# Patient Record
Sex: FEMALE | Race: OTHER | Employment: STUDENT | URBAN - METROPOLITAN AREA
[De-identification: names, ages, dates, MRNs, and addresses within clinical notes are randomized per-mention and may not be internally consistent; named-entity substitution may affect disease eponyms.]

---
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
## 2022-03-01 ENCOUNTER — OFFICE VISIT (OUTPATIENT)
Dept: OBGYN CLINIC | Facility: CLINIC | Age: 22
End: 2022-03-01
Payer: MEDICAID

## 2022-03-01 VITALS — BODY MASS INDEX: 28 KG/M2 | WEIGHT: 140 LBS

## 2022-03-01 DIAGNOSIS — R35.0 URINE FREQUENCY: ICD-10-CM

## 2022-03-01 DIAGNOSIS — N91.5 HYPOMENORRHEA: ICD-10-CM

## 2022-03-01 DIAGNOSIS — B37.3 VULVOVAGINITIS DUE TO YEAST: ICD-10-CM

## 2022-03-01 DIAGNOSIS — N89.8 VAGINAL DISCHARGE: Primary | ICD-10-CM

## 2022-03-01 PROBLEM — B37.31 VULVOVAGINITIS DUE TO YEAST: Status: ACTIVE | Noted: 2020-09-10

## 2022-03-01 PROBLEM — Z01.419 WELL WOMAN EXAM WITH ROUTINE GYNECOLOGICAL EXAM: Status: RESOLVED | Noted: 2020-09-10 | Resolved: 2022-03-01

## 2022-03-01 LAB
BILIRUBIN: NEGATIVE
CONTROL LINE PRESENT WITH A CLEAR BACKGROUND (YES/NO): YES YES/NO
GLUCOSE (URINE DIPSTICK): NEGATIVE MG/DL
KETONES (URINE DIPSTICK): NEGATIVE MG/DL
MULTISTIX LOT#: ABNORMAL NUMERIC
NITRITE, URINE: NEGATIVE
PH, URINE: 6 (ref 4.5–8)
PREGNANCY TEST, URINE: NEGATIVE
SPECIFIC GRAVITY: 1.01 (ref 1–1.03)
URINE-COLOR: YELLOW
UROBILINOGEN,SEMI-QN: 0.2 MG/DL (ref 0–1.9)

## 2022-03-01 PROCEDURE — 81002 URINALYSIS NONAUTO W/O SCOPE: CPT | Performed by: OBSTETRICS & GYNECOLOGY

## 2022-03-01 PROCEDURE — 99213 OFFICE O/P EST LOW 20 MIN: CPT | Performed by: OBSTETRICS & GYNECOLOGY

## 2022-03-01 PROCEDURE — 81025 URINE PREGNANCY TEST: CPT | Performed by: OBSTETRICS & GYNECOLOGY

## 2022-03-01 RX ORDER — FLUCONAZOLE 150 MG/1
TABLET ORAL
Qty: 1 TABLET | Refills: 1 | Status: SHIPPED | OUTPATIENT
Start: 2022-03-01

## 2022-03-12 ENCOUNTER — NURSE ONLY (OUTPATIENT)
Dept: OBGYN CLINIC | Facility: CLINIC | Age: 22
End: 2022-03-12
Payer: MEDICAID

## 2022-03-12 VITALS — DIASTOLIC BLOOD PRESSURE: 76 MMHG | SYSTOLIC BLOOD PRESSURE: 114 MMHG

## 2022-03-12 DIAGNOSIS — Z23 NEED FOR HPV VACCINE: Primary | ICD-10-CM

## 2022-03-12 PROCEDURE — 90651 9VHPV VACCINE 2/3 DOSE IM: CPT | Performed by: NURSE PRACTITIONER

## 2022-03-12 PROCEDURE — 90471 IMMUNIZATION ADMIN: CPT | Performed by: NURSE PRACTITIONER

## 2022-03-12 PROCEDURE — 81025 URINE PREGNANCY TEST: CPT | Performed by: NURSE PRACTITIONER

## 2022-03-12 PROCEDURE — 3078F DIAST BP <80 MM HG: CPT | Performed by: NURSE PRACTITIONER

## 2022-03-12 PROCEDURE — 3074F SYST BP LT 130 MM HG: CPT | Performed by: NURSE PRACTITIONER

## 2022-08-31 ENCOUNTER — APPOINTMENT (OUTPATIENT)
Dept: GENERAL RADIOLOGY | Age: 22
End: 2022-08-31
Attending: NURSE PRACTITIONER
Payer: MEDICAID

## 2022-08-31 ENCOUNTER — HOSPITAL ENCOUNTER (OUTPATIENT)
Age: 22
Discharge: HOME | End: 2022-08-31
Payer: MEDICAID

## 2022-08-31 VITALS
SYSTOLIC BLOOD PRESSURE: 114 MMHG | DIASTOLIC BLOOD PRESSURE: 71 MMHG | BODY MASS INDEX: 29.23 KG/M2 | RESPIRATION RATE: 16 BRPM | OXYGEN SATURATION: 100 % | WEIGHT: 145 LBS | HEIGHT: 59 IN | TEMPERATURE: 98 F | HEART RATE: 68 BPM

## 2022-08-31 DIAGNOSIS — K59.00 CONSTIPATION, UNSPECIFIED CONSTIPATION TYPE: Primary | ICD-10-CM

## 2022-08-31 LAB
#MXD IC: 0.9 X10ˆ3/UL (ref 0.1–1)
B-HCG UR QL: NEGATIVE
BILIRUB UR QL STRIP: NEGATIVE
BUN BLD-MCNC: <5 MG/DL (ref 7–18)
CHLORIDE BLD-SCNC: 102 MMOL/L (ref 98–112)
CLARITY UR: CLEAR
CO2 BLD-SCNC: 25 MMOL/L (ref 21–32)
COLOR UR: YELLOW
CREAT BLD-MCNC: 0.5 MG/DL
GFR SERPLBLD BASED ON 1.73 SQ M-ARVRAT: 138 ML/MIN/1.73M2 (ref 60–?)
GLUCOSE BLD-MCNC: 93 MG/DL (ref 70–99)
GLUCOSE UR STRIP-MCNC: NEGATIVE MG/DL
HCT VFR BLD AUTO: 36.8 %
HCT VFR BLD CALC: 39 %
HGB BLD-MCNC: 11.4 G/DL
HGB UR QL STRIP: NEGATIVE
ISTAT IONIZED CALCIUM FOR CHEM 8: 1.25 MMOL/L (ref 1.12–1.32)
KETONES UR STRIP-MCNC: NEGATIVE MG/DL
LYMPHOCYTES # BLD AUTO: 2.4 X10ˆ3/UL (ref 1–4)
LYMPHOCYTES NFR BLD AUTO: 22.7 %
MCH RBC QN AUTO: 22.3 PG (ref 26–34)
MCHC RBC AUTO-ENTMCNC: 31 G/DL (ref 31–37)
MCV RBC AUTO: 71.9 FL (ref 80–100)
MIXED CELL %: 8.2 %
NEUTROPHILS # BLD AUTO: 7.4 X10ˆ3/UL (ref 1.5–7.7)
NEUTROPHILS NFR BLD AUTO: 69.1 %
NITRITE UR QL STRIP: NEGATIVE
PH UR STRIP: 8.5 [PH]
PLATELET # BLD AUTO: 392 X10ˆ3/UL (ref 150–450)
POTASSIUM BLD-SCNC: 3.9 MMOL/L (ref 3.6–5.1)
PROT UR STRIP-MCNC: NEGATIVE MG/DL
RBC # BLD AUTO: 5.12 X10ˆ6/UL
SODIUM BLD-SCNC: 139 MMOL/L (ref 136–145)
SP GR UR STRIP: 1.02
UROBILINOGEN UR STRIP-ACNC: 4 MG/DL
WBC # BLD AUTO: 10.7 X10ˆ3/UL (ref 4–11)

## 2022-08-31 PROCEDURE — 99204 OFFICE O/P NEW MOD 45 MIN: CPT | Performed by: NURSE PRACTITIONER

## 2022-08-31 PROCEDURE — 74018 RADEX ABDOMEN 1 VIEW: CPT | Performed by: NURSE PRACTITIONER

## 2022-08-31 PROCEDURE — 80047 BASIC METABLC PNL IONIZED CA: CPT | Performed by: NURSE PRACTITIONER

## 2022-08-31 PROCEDURE — 81025 URINE PREGNANCY TEST: CPT | Performed by: NURSE PRACTITIONER

## 2022-08-31 PROCEDURE — 36415 COLL VENOUS BLD VENIPUNCTURE: CPT | Performed by: NURSE PRACTITIONER

## 2022-08-31 PROCEDURE — 81002 URINALYSIS NONAUTO W/O SCOPE: CPT | Performed by: NURSE PRACTITIONER

## 2022-08-31 PROCEDURE — 85025 COMPLETE CBC W/AUTO DIFF WBC: CPT | Performed by: NURSE PRACTITIONER

## 2022-08-31 RX ORDER — POLYETHYLENE GLYCOL 3350 17 G/17G
17 POWDER, FOR SOLUTION ORAL DAILY PRN
Qty: 12 EACH | Refills: 0 | Status: SHIPPED | OUTPATIENT
Start: 2022-08-31 | End: 2022-09-30

## 2022-08-31 NOTE — ED INITIAL ASSESSMENT (HPI)
Pt presents to the IC with c/o abdominal pain, concerned for constipation, no BM for 1 week. Nauseous, no vomiting.

## 2022-09-06 ENCOUNTER — TELEPHONE (OUTPATIENT)
Dept: OBGYN CLINIC | Facility: CLINIC | Age: 22
End: 2022-09-06

## 2022-09-06 RX ORDER — NORGESTIMATE AND ETHINYL ESTRADIOL 7DAYSX3 LO
1 KIT ORAL DAILY
Qty: 28 TABLET | Refills: 1 | Status: SHIPPED | OUTPATIENT
Start: 2022-09-06

## 2022-09-06 NOTE — TELEPHONE ENCOUNTER
Patient contacted Ariana/pharm regarding script for birth control. Patient has no refills left, appointment scheduled 10/06. Please update through [email or web address], thanks.

## 2022-10-06 ENCOUNTER — OFFICE VISIT (OUTPATIENT)
Dept: OBGYN CLINIC | Facility: CLINIC | Age: 22
End: 2022-10-06
Payer: MEDICAID

## 2022-10-06 VITALS — SYSTOLIC BLOOD PRESSURE: 122 MMHG | DIASTOLIC BLOOD PRESSURE: 83 MMHG | WEIGHT: 153 LBS | BODY MASS INDEX: 31 KG/M2

## 2022-10-06 DIAGNOSIS — Z01.419 WELL WOMAN EXAM WITH ROUTINE GYNECOLOGICAL EXAM: Primary | ICD-10-CM

## 2022-10-06 DIAGNOSIS — Z11.3 SCREEN FOR STD (SEXUALLY TRANSMITTED DISEASE): ICD-10-CM

## 2022-10-06 DIAGNOSIS — N89.8 VAGINAL DISCHARGE: ICD-10-CM

## 2022-10-06 PROCEDURE — 99395 PREV VISIT EST AGE 18-39: CPT | Performed by: OBSTETRICS & GYNECOLOGY

## 2022-10-06 PROCEDURE — 3079F DIAST BP 80-89 MM HG: CPT | Performed by: OBSTETRICS & GYNECOLOGY

## 2022-10-06 PROCEDURE — 3074F SYST BP LT 130 MM HG: CPT | Performed by: OBSTETRICS & GYNECOLOGY

## 2022-10-06 RX ORDER — NORGESTIMATE AND ETHINYL ESTRADIOL 7DAYSX3 LO
1 KIT ORAL DAILY
Qty: 84 TABLET | Refills: 3 | Status: SHIPPED | OUTPATIENT
Start: 2022-10-06

## 2022-10-07 LAB
C TRACH DNA SPEC QL NAA+PROBE: NEGATIVE
N GONORRHOEA DNA SPEC QL NAA+PROBE: NEGATIVE

## 2022-10-08 LAB
GENITAL VAGINOSIS SCREEN: NEGATIVE
TRICHOMONAS SCREEN: NEGATIVE

## 2022-10-10 ENCOUNTER — TELEPHONE (OUTPATIENT)
Dept: OBGYN CLINIC | Facility: CLINIC | Age: 22
End: 2022-10-10

## 2022-10-10 RX ORDER — FLUCONAZOLE 150 MG/1
150 TABLET ORAL ONCE
Qty: 1 TABLET | Refills: 1 | Status: SHIPPED | OUTPATIENT
Start: 2022-10-10 | End: 2022-10-10

## 2022-10-10 RX ORDER — CLOTRIMAZOLE AND BETAMETHASONE DIPROPIONATE 10; .64 MG/G; MG/G
1 CREAM TOPICAL 2 TIMES DAILY
Qty: 45 G | Refills: 0 | Status: SHIPPED | OUTPATIENT
Start: 2022-10-10

## 2022-10-10 NOTE — TELEPHONE ENCOUNTER
10/10/2022  verified with pt. Pt was informed about her Vaginal culture results. Pt verbalized understanding. Pt would like to get the oral medication and Lotrisone vaginal cream.       Please Advs. RN     Rx can be sent to her Pharmacy in file.

## 2022-10-10 NOTE — TELEPHONE ENCOUNTER
----- Message from Cain Tobar MD sent at 10/10/2022 11:32 AM CDT -----  Please inform that testing shows patient has a vaginal yeast infection. Recommended treatment options include either vaginal creams or oral tablets. If she desires vaginal cream, she can use OTC Monistat 7 vaginal cream, one applicatorful vaginally for 7 days. If she desires an oral pill, she can be prescribed Diflucan 150 mg tablet by mouth once. A refill for one additional tablet which can be taken after 3 days. If she has significant external vulvar itching, we can also prescribe Lotrisone vaginal cream- apply externally BID, 45 gm tube. No refills.

## 2022-10-12 ENCOUNTER — TELEPHONE (OUTPATIENT)
Dept: OBGYN CLINIC | Facility: CLINIC | Age: 22
End: 2022-10-12

## 2022-10-12 NOTE — TELEPHONE ENCOUNTER
Call placed to patient advised of MD recommendation as noted below. Verbalized understanding and agrees with plan.

## 2022-10-12 NOTE — TELEPHONE ENCOUNTER
There is no exact counterpart but two separate creams can be used.  Clotrimazole 1 or 2% applied externally bid prn and 1% hydrocortisone applied similarly (I believe only available OTC)

## 2023-01-05 ENCOUNTER — LAB ENCOUNTER (OUTPATIENT)
Dept: LAB | Age: 23
End: 2023-01-05
Payer: MEDICAID

## 2023-01-05 DIAGNOSIS — R11.0 NAUSEA: ICD-10-CM

## 2023-01-05 DIAGNOSIS — R10.9 STOMACH ACHE: ICD-10-CM

## 2023-01-05 DIAGNOSIS — N39.0 URINARY TRACT INFECTION, SITE NOT SPECIFIED: ICD-10-CM

## 2023-01-05 DIAGNOSIS — R30.0 DYSURIA: ICD-10-CM

## 2023-01-05 DIAGNOSIS — G43.009 COMMON MIGRAINE: Primary | ICD-10-CM

## 2023-01-05 LAB
BILIRUB UR QL CFM: NEGATIVE
CLARITY UR: CLEAR
COLOR UR: YELLOW
GLUCOSE UR-MCNC: NEGATIVE MG/DL
HGB UR QL STRIP.AUTO: NEGATIVE
LEUKOCYTE ESTERASE UR QL STRIP.AUTO: NEGATIVE
NITRITE UR QL STRIP.AUTO: NEGATIVE
PH UR: 5.5 [PH] (ref 5–8)
PROT UR-MCNC: NEGATIVE MG/DL
SP GR UR STRIP: >=1.03 (ref 1–1.03)
UROBILINOGEN UR STRIP-ACNC: 1

## 2023-01-05 PROCEDURE — 81003 URINALYSIS AUTO W/O SCOPE: CPT

## 2023-01-05 PROCEDURE — 87086 URINE CULTURE/COLONY COUNT: CPT

## 2023-01-07 ENCOUNTER — APPOINTMENT (OUTPATIENT)
Dept: CT IMAGING | Facility: HOSPITAL | Age: 23
End: 2023-01-07
Attending: EMERGENCY MEDICINE
Payer: MEDICAID

## 2023-01-07 ENCOUNTER — HOSPITAL ENCOUNTER (EMERGENCY)
Facility: HOSPITAL | Age: 23
Discharge: HOME | End: 2023-01-07
Attending: EMERGENCY MEDICINE
Payer: MEDICAID

## 2023-01-07 VITALS
TEMPERATURE: 100 F | HEART RATE: 108 BPM | OXYGEN SATURATION: 97 % | DIASTOLIC BLOOD PRESSURE: 63 MMHG | RESPIRATION RATE: 19 BRPM | SYSTOLIC BLOOD PRESSURE: 112 MMHG

## 2023-01-07 DIAGNOSIS — K52.9 GASTROENTERITIS: Primary | ICD-10-CM

## 2023-01-07 LAB
ALBUMIN SERPL-MCNC: 3.4 G/DL (ref 3.4–5)
ALBUMIN/GLOB SERPL: 0.9 {RATIO} (ref 1–2)
ALP LIVER SERPL-CCNC: 94 U/L
ALT SERPL-CCNC: 22 U/L
ANION GAP SERPL CALC-SCNC: 9 MMOL/L (ref 0–18)
AST SERPL-CCNC: 14 U/L (ref 15–37)
B-HCG UR QL: NEGATIVE
BASOPHILS # BLD AUTO: 0.06 X10(3) UL (ref 0–0.2)
BASOPHILS NFR BLD AUTO: 0.4 %
BILIRUB SERPL-MCNC: 0.3 MG/DL (ref 0.1–2)
BILIRUB UR QL CFM: NEGATIVE
BUN BLD-MCNC: 13 MG/DL (ref 7–18)
BUN/CREAT SERPL: 16.3 (ref 10–20)
CALCIUM BLD-MCNC: 8.9 MG/DL (ref 8.5–10.1)
CHLORIDE SERPL-SCNC: 108 MMOL/L (ref 98–112)
CLARITY UR: CLEAR
CO2 SERPL-SCNC: 24 MMOL/L (ref 21–32)
COLOR UR: YELLOW
CREAT BLD-MCNC: 0.8 MG/DL
DEPRECATED RDW RBC AUTO: 40.7 FL (ref 35.1–46.3)
EOSINOPHIL # BLD AUTO: 0.14 X10(3) UL (ref 0–0.7)
EOSINOPHIL NFR BLD AUTO: 0.9 %
ERYTHROCYTE [DISTWIDTH] IN BLOOD BY AUTOMATED COUNT: 15.9 % (ref 11–15)
FLUAV + FLUBV RNA SPEC NAA+PROBE: NEGATIVE
FLUAV + FLUBV RNA SPEC NAA+PROBE: NEGATIVE
GFR SERPLBLD BASED ON 1.73 SQ M-ARVRAT: 108 ML/MIN/1.73M2 (ref 60–?)
GLOBULIN PLAS-MCNC: 3.7 G/DL (ref 2.8–4.4)
GLUCOSE BLD-MCNC: 103 MG/DL (ref 70–99)
GLUCOSE UR-MCNC: NEGATIVE MG/DL
HCT VFR BLD AUTO: 41.4 %
HGB BLD-MCNC: 12.5 G/DL
IMM GRANULOCYTES # BLD AUTO: 0.06 X10(3) UL (ref 0–1)
IMM GRANULOCYTES NFR BLD: 0.4 %
KETONES UR-MCNC: 15 MG/DL
LYMPHOCYTES # BLD AUTO: 1.16 X10(3) UL (ref 1–4)
LYMPHOCYTES NFR BLD AUTO: 7.2 %
MCH RBC QN AUTO: 22 PG (ref 26–34)
MCHC RBC AUTO-ENTMCNC: 30.2 G/DL (ref 31–37)
MCV RBC AUTO: 73 FL
MONOCYTES # BLD AUTO: 0.85 X10(3) UL (ref 0.1–1)
MONOCYTES NFR BLD AUTO: 5.3 %
NEUTROPHILS # BLD AUTO: 13.78 X10 (3) UL (ref 1.5–7.7)
NEUTROPHILS # BLD AUTO: 13.78 X10(3) UL (ref 1.5–7.7)
NEUTROPHILS NFR BLD AUTO: 85.8 %
NITRITE UR QL STRIP.AUTO: NEGATIVE
OSMOLALITY SERPL CALC.SUM OF ELEC: 292 MOSM/KG (ref 275–295)
PH UR: 5.5 [PH] (ref 5–8)
PLATELET # BLD AUTO: 420 10(3)UL (ref 150–450)
POTASSIUM SERPL-SCNC: 3.6 MMOL/L (ref 3.5–5.1)
PROT SERPL-MCNC: 7.1 G/DL (ref 6.4–8.2)
RBC # BLD AUTO: 5.67 X10(6)UL
RSV RNA SPEC NAA+PROBE: NEGATIVE
SARS-COV-2 RNA RESP QL NAA+PROBE: NOT DETECTED
SODIUM SERPL-SCNC: 141 MMOL/L (ref 136–145)
SP GR UR STRIP: >=1.03 (ref 1–1.03)
UROBILINOGEN UR STRIP-ACNC: 0.2
WBC # BLD AUTO: 16.1 X10(3) UL (ref 4–11)

## 2023-01-07 PROCEDURE — 0241U SARS-COV-2/FLU A AND B/RSV BY PCR (GENEXPERT): CPT | Performed by: EMERGENCY MEDICINE

## 2023-01-07 PROCEDURE — 85025 COMPLETE CBC W/AUTO DIFF WBC: CPT

## 2023-01-07 PROCEDURE — 99285 EMERGENCY DEPT VISIT HI MDM: CPT

## 2023-01-07 PROCEDURE — 99284 EMERGENCY DEPT VISIT MOD MDM: CPT

## 2023-01-07 PROCEDURE — 85060 BLOOD SMEAR INTERPRETATION: CPT | Performed by: EMERGENCY MEDICINE

## 2023-01-07 PROCEDURE — 81025 URINE PREGNANCY TEST: CPT

## 2023-01-07 PROCEDURE — 81015 MICROSCOPIC EXAM OF URINE: CPT | Performed by: EMERGENCY MEDICINE

## 2023-01-07 PROCEDURE — 80053 COMPREHEN METABOLIC PANEL: CPT | Performed by: EMERGENCY MEDICINE

## 2023-01-07 PROCEDURE — 96361 HYDRATE IV INFUSION ADD-ON: CPT

## 2023-01-07 PROCEDURE — 96374 THER/PROPH/DIAG INJ IV PUSH: CPT

## 2023-01-07 PROCEDURE — 81001 URINALYSIS AUTO W/SCOPE: CPT | Performed by: EMERGENCY MEDICINE

## 2023-01-07 PROCEDURE — 85025 COMPLETE CBC W/AUTO DIFF WBC: CPT | Performed by: EMERGENCY MEDICINE

## 2023-01-07 PROCEDURE — 74177 CT ABD & PELVIS W/CONTRAST: CPT | Performed by: EMERGENCY MEDICINE

## 2023-01-07 PROCEDURE — 80053 COMPREHEN METABOLIC PANEL: CPT

## 2023-01-07 RX ORDER — KETOROLAC TROMETHAMINE 15 MG/ML
15 INJECTION, SOLUTION INTRAMUSCULAR; INTRAVENOUS ONCE
Status: COMPLETED | OUTPATIENT
Start: 2023-01-07 | End: 2023-01-07

## 2023-01-07 RX ORDER — ONDANSETRON 2 MG/ML
4 INJECTION INTRAMUSCULAR; INTRAVENOUS ONCE
Status: CANCELLED | OUTPATIENT
Start: 2023-01-07 | End: 2023-01-07

## 2023-01-07 RX ORDER — ONDANSETRON 4 MG/1
4 TABLET, ORALLY DISINTEGRATING ORAL EVERY 8 HOURS PRN
Qty: 10 TABLET | Refills: 0 | Status: SHIPPED | OUTPATIENT
Start: 2023-01-07 | End: 2023-01-14

## 2023-01-07 RX ORDER — ACETAMINOPHEN 325 MG/1
650 TABLET ORAL ONCE
Status: COMPLETED | OUTPATIENT
Start: 2023-01-07 | End: 2023-01-07

## 2023-01-07 RX ORDER — ONDANSETRON 4 MG/1
4 TABLET, ORALLY DISINTEGRATING ORAL ONCE
Status: COMPLETED | OUTPATIENT
Start: 2023-01-07 | End: 2023-01-07

## 2023-01-07 NOTE — ED QUICK NOTES
Pt. Came into the ED with sharp abdominal pain. Has been having N/V/D, no fevers that she knows of. Felt constipated a couple days ago with bloating. Last vomiting and diarrhea while in waiting room.

## 2023-01-07 NOTE — ED QUICK NOTES
Patient A&OX4, denies SOB/CP/Dizziness. No pain. Discharge instructions given. All questions answered. Ambulatory home.

## 2023-06-13 ENCOUNTER — OFFICE VISIT (OUTPATIENT)
Dept: OBGYN CLINIC | Facility: CLINIC | Age: 23
End: 2023-06-13

## 2023-06-13 VITALS
SYSTOLIC BLOOD PRESSURE: 127 MMHG | BODY MASS INDEX: 32.86 KG/M2 | WEIGHT: 163 LBS | DIASTOLIC BLOOD PRESSURE: 86 MMHG | HEART RATE: 93 BPM | HEIGHT: 59 IN

## 2023-06-13 DIAGNOSIS — N92.6 MISSED MENSES: Primary | ICD-10-CM

## 2023-06-13 LAB
CONTROL LINE PRESENT WITH A CLEAR BACKGROUND (YES/NO): YES YES/NO
KIT LOT #: NORMAL NUMERIC
PREGNANCY TEST, URINE: POSITIVE

## 2023-06-13 PROCEDURE — 3074F SYST BP LT 130 MM HG: CPT | Performed by: ADVANCED PRACTICE MIDWIFE

## 2023-06-13 PROCEDURE — 3008F BODY MASS INDEX DOCD: CPT | Performed by: ADVANCED PRACTICE MIDWIFE

## 2023-06-13 PROCEDURE — 81025 URINE PREGNANCY TEST: CPT | Performed by: ADVANCED PRACTICE MIDWIFE

## 2023-06-13 PROCEDURE — 99213 OFFICE O/P EST LOW 20 MIN: CPT | Performed by: ADVANCED PRACTICE MIDWIFE

## 2023-06-13 PROCEDURE — 3079F DIAST BP 80-89 MM HG: CPT | Performed by: ADVANCED PRACTICE MIDWIFE

## 2023-06-13 RX ORDER — ASPIRIN 81 MG/1
120 TABLET, CHEWABLE ORAL DAILY
Qty: 60 TABLET | Refills: 5 | Status: SHIPPED | OUTPATIENT
Start: 2023-06-13

## 2023-06-13 RX ORDER — CHOLECALCIFEROL (VITAMIN D3) 25 MCG
1 TABLET,CHEWABLE ORAL DAILY
COMMUNITY

## 2023-06-20 ENCOUNTER — HOSPITAL ENCOUNTER (EMERGENCY)
Facility: HOSPITAL | Age: 23
Discharge: HOME | End: 2023-06-21
Attending: EMERGENCY MEDICINE
Payer: MEDICAID

## 2023-06-20 DIAGNOSIS — O20.0 THREATENED ABORTION: Primary | ICD-10-CM

## 2023-06-20 LAB
ALBUMIN SERPL-MCNC: 3.3 G/DL (ref 3.4–5)
ALBUMIN/GLOB SERPL: 0.9 {RATIO} (ref 1–2)
ALP LIVER SERPL-CCNC: 91 U/L
ALT SERPL-CCNC: 23 U/L
ANION GAP SERPL CALC-SCNC: 6 MMOL/L (ref 0–18)
AST SERPL-CCNC: 16 U/L (ref 15–37)
B-HCG SERPL-ACNC: ABNORMAL MIU/ML
B-HCG UR QL: POSITIVE
BASOPHILS # BLD AUTO: 0.04 X10(3) UL (ref 0–0.2)
BASOPHILS NFR BLD AUTO: 0.3 %
BILIRUB SERPL-MCNC: 0.2 MG/DL (ref 0.1–2)
BILIRUB UR QL: NEGATIVE
BUN BLD-MCNC: 8 MG/DL (ref 7–18)
BUN/CREAT SERPL: 16.3 (ref 10–20)
CALCIUM BLD-MCNC: 9.2 MG/DL (ref 8.5–10.1)
CHLORIDE SERPL-SCNC: 108 MMOL/L (ref 98–112)
CLARITY UR: CLEAR
CO2 SERPL-SCNC: 23 MMOL/L (ref 21–32)
COLOR UR: YELLOW
CREAT BLD-MCNC: 0.49 MG/DL
DEPRECATED RDW RBC AUTO: 42.6 FL (ref 35.1–46.3)
EOSINOPHIL # BLD AUTO: 0.13 X10(3) UL (ref 0–0.7)
EOSINOPHIL NFR BLD AUTO: 1 %
ERYTHROCYTE [DISTWIDTH] IN BLOOD BY AUTOMATED COUNT: 16.3 % (ref 11–15)
GFR SERPLBLD BASED ON 1.73 SQ M-ARVRAT: 137 ML/MIN/1.73M2 (ref 60–?)
GLOBULIN PLAS-MCNC: 3.6 G/DL (ref 2.8–4.4)
GLUCOSE BLD-MCNC: 99 MG/DL (ref 70–99)
GLUCOSE UR-MCNC: NORMAL MG/DL
HCT VFR BLD AUTO: 37 %
HGB BLD-MCNC: 11.8 G/DL
IMM GRANULOCYTES # BLD AUTO: 0.05 X10(3) UL (ref 0–1)
IMM GRANULOCYTES NFR BLD: 0.4 %
KETONES UR-MCNC: NEGATIVE MG/DL
LEUKOCYTE ESTERASE UR QL STRIP.AUTO: NEGATIVE
LYMPHOCYTES # BLD AUTO: 1.7 X10(3) UL (ref 1–4)
LYMPHOCYTES NFR BLD AUTO: 12.5 %
MCH RBC QN AUTO: 23.4 PG (ref 26–34)
MCHC RBC AUTO-ENTMCNC: 31.9 G/DL (ref 31–37)
MCV RBC AUTO: 73.4 FL
MONOCYTES # BLD AUTO: 0.95 X10(3) UL (ref 0.1–1)
MONOCYTES NFR BLD AUTO: 7 %
NEUTROPHILS # BLD AUTO: 10.69 X10 (3) UL (ref 1.5–7.7)
NEUTROPHILS # BLD AUTO: 10.69 X10(3) UL (ref 1.5–7.7)
NEUTROPHILS NFR BLD AUTO: 78.8 %
NITRITE UR QL STRIP.AUTO: NEGATIVE
OSMOLALITY SERPL CALC.SUM OF ELEC: 282 MOSM/KG (ref 275–295)
PH UR: 6.5 [PH] (ref 5–8)
PLATELET # BLD AUTO: 351 10(3)UL (ref 150–450)
POTASSIUM SERPL-SCNC: 4.1 MMOL/L (ref 3.5–5.1)
PROT SERPL-MCNC: 6.9 G/DL (ref 6.4–8.2)
PROT UR-MCNC: NEGATIVE MG/DL
RBC # BLD AUTO: 5.04 X10(6)UL
RH BLOOD TYPE: POSITIVE
SODIUM SERPL-SCNC: 137 MMOL/L (ref 136–145)
SP GR UR STRIP: 1.02 (ref 1–1.03)
UROBILINOGEN UR STRIP-ACNC: NORMAL
WBC # BLD AUTO: 13.6 X10(3) UL (ref 4–11)

## 2023-06-20 PROCEDURE — 86900 BLOOD TYPING SEROLOGIC ABO: CPT

## 2023-06-20 PROCEDURE — 99284 EMERGENCY DEPT VISIT MOD MDM: CPT

## 2023-06-20 PROCEDURE — 36415 COLL VENOUS BLD VENIPUNCTURE: CPT

## 2023-06-20 PROCEDURE — 81001 URINALYSIS AUTO W/SCOPE: CPT | Performed by: EMERGENCY MEDICINE

## 2023-06-20 PROCEDURE — 81025 URINE PREGNANCY TEST: CPT

## 2023-06-20 PROCEDURE — 85025 COMPLETE CBC W/AUTO DIFF WBC: CPT

## 2023-06-20 PROCEDURE — 86901 BLOOD TYPING SEROLOGIC RH(D): CPT

## 2023-06-20 PROCEDURE — 80053 COMPREHEN METABOLIC PANEL: CPT | Performed by: EMERGENCY MEDICINE

## 2023-06-20 PROCEDURE — 84702 CHORIONIC GONADOTROPIN TEST: CPT | Performed by: EMERGENCY MEDICINE

## 2023-06-20 PROCEDURE — 85025 COMPLETE CBC W/AUTO DIFF WBC: CPT | Performed by: EMERGENCY MEDICINE

## 2023-06-20 PROCEDURE — 86901 BLOOD TYPING SEROLOGIC RH(D): CPT | Performed by: EMERGENCY MEDICINE

## 2023-06-20 PROCEDURE — 86900 BLOOD TYPING SEROLOGIC ABO: CPT | Performed by: EMERGENCY MEDICINE

## 2023-06-20 PROCEDURE — 80053 COMPREHEN METABOLIC PANEL: CPT

## 2023-06-20 PROCEDURE — 84702 CHORIONIC GONADOTROPIN TEST: CPT

## 2023-06-21 ENCOUNTER — APPOINTMENT (OUTPATIENT)
Dept: ULTRASOUND IMAGING | Facility: HOSPITAL | Age: 23
End: 2023-06-21
Attending: EMERGENCY MEDICINE
Payer: MEDICAID

## 2023-06-21 VITALS
RESPIRATION RATE: 18 BRPM | HEART RATE: 83 BPM | OXYGEN SATURATION: 98 % | HEIGHT: 59 IN | DIASTOLIC BLOOD PRESSURE: 73 MMHG | SYSTOLIC BLOOD PRESSURE: 115 MMHG | BODY MASS INDEX: 32.86 KG/M2 | WEIGHT: 163 LBS | TEMPERATURE: 98 F

## 2023-06-21 PROCEDURE — 76801 OB US < 14 WKS SINGLE FETUS: CPT | Performed by: EMERGENCY MEDICINE

## 2023-06-21 NOTE — ED INITIAL ASSESSMENT (HPI)
Patient arrives from home, c/c patient approx 8 weeks pregnant and vaginal bleeding started 30 mins pta, denies cramping or pain.

## 2023-06-29 ENCOUNTER — NURSE ONLY (OUTPATIENT)
Dept: OBGYN CLINIC | Facility: CLINIC | Age: 23
End: 2023-06-29

## 2023-06-29 DIAGNOSIS — Z34.01 ENCOUNTER FOR SUPERVISION OF NORMAL FIRST PREGNANCY IN FIRST TRIMESTER: Primary | ICD-10-CM

## 2023-07-01 ENCOUNTER — LAB ENCOUNTER (OUTPATIENT)
Dept: LAB | Age: 23
End: 2023-07-01
Attending: ADVANCED PRACTICE MIDWIFE
Payer: MEDICAID

## 2023-07-01 DIAGNOSIS — Z34.01 ENCOUNTER FOR SUPERVISION OF NORMAL FIRST PREGNANCY IN FIRST TRIMESTER: ICD-10-CM

## 2023-07-01 LAB
ANTIBODY SCREEN: NEGATIVE
BASOPHILS # BLD AUTO: 0.05 X10(3) UL (ref 0–0.2)
BASOPHILS NFR BLD AUTO: 0.5 %
EOSINOPHIL # BLD AUTO: 0.1 X10(3) UL (ref 0–0.7)
EOSINOPHIL NFR BLD AUTO: 1 %
ERYTHROCYTE [DISTWIDTH] IN BLOOD BY AUTOMATED COUNT: 16.6 %
HBV SURFACE AG SER-ACNC: <0.1 [IU]/L
HBV SURFACE AG SERPL QL IA: NONREACTIVE
HCT VFR BLD AUTO: 39.1 %
HCV AB SERPL QL IA: NONREACTIVE
HGB BLD-MCNC: 11.9 G/DL
IMM GRANULOCYTES # BLD AUTO: 0.04 X10(3) UL (ref 0–1)
IMM GRANULOCYTES NFR BLD: 0.4 %
LYMPHOCYTES # BLD AUTO: 1.76 X10(3) UL (ref 1–4)
LYMPHOCYTES NFR BLD AUTO: 17.7 %
MCH RBC QN AUTO: 23.4 PG (ref 26–34)
MCHC RBC AUTO-ENTMCNC: 30.4 G/DL (ref 31–37)
MCV RBC AUTO: 77 FL
MONOCYTES # BLD AUTO: 0.67 X10(3) UL (ref 0.1–1)
MONOCYTES NFR BLD AUTO: 6.8 %
NEUTROPHILS # BLD AUTO: 7.3 X10 (3) UL (ref 1.5–7.7)
NEUTROPHILS # BLD AUTO: 7.3 X10(3) UL (ref 1.5–7.7)
NEUTROPHILS NFR BLD AUTO: 73.6 %
PLATELET # BLD AUTO: 335 10(3)UL (ref 150–450)
RBC # BLD AUTO: 5.08 X10(6)UL
RH BLOOD TYPE: POSITIVE
RUBV IGG SER QL: POSITIVE
RUBV IGG SER-ACNC: 167.1 IU/ML (ref 10–?)
T PALLIDUM AB SER QL IA: NONREACTIVE
WBC # BLD AUTO: 9.9 X10(3) UL (ref 4–11)

## 2023-07-01 PROCEDURE — 86900 BLOOD TYPING SEROLOGIC ABO: CPT

## 2023-07-01 PROCEDURE — 86850 RBC ANTIBODY SCREEN: CPT

## 2023-07-01 PROCEDURE — 86780 TREPONEMA PALLIDUM: CPT

## 2023-07-01 PROCEDURE — 86762 RUBELLA ANTIBODY: CPT

## 2023-07-01 PROCEDURE — 87389 HIV-1 AG W/HIV-1&-2 AB AG IA: CPT

## 2023-07-01 PROCEDURE — 85025 COMPLETE CBC W/AUTO DIFF WBC: CPT

## 2023-07-01 PROCEDURE — 87086 URINE CULTURE/COLONY COUNT: CPT

## 2023-07-01 PROCEDURE — 86803 HEPATITIS C AB TEST: CPT

## 2023-07-01 PROCEDURE — 87340 HEPATITIS B SURFACE AG IA: CPT

## 2023-07-01 PROCEDURE — 86901 BLOOD TYPING SEROLOGIC RH(D): CPT

## 2023-07-01 PROCEDURE — 36415 COLL VENOUS BLD VENIPUNCTURE: CPT

## 2023-07-03 ENCOUNTER — ROUTINE PRENATAL (OUTPATIENT)
Dept: OBGYN CLINIC | Facility: CLINIC | Age: 23
End: 2023-07-03

## 2023-07-03 VITALS — WEIGHT: 158.81 LBS | DIASTOLIC BLOOD PRESSURE: 77 MMHG | BODY MASS INDEX: 32 KG/M2 | SYSTOLIC BLOOD PRESSURE: 115 MMHG

## 2023-07-03 DIAGNOSIS — Z34.81 ENCOUNTER FOR SUPERVISION OF OTHER NORMAL PREGNANCY IN FIRST TRIMESTER: ICD-10-CM

## 2023-07-03 DIAGNOSIS — Z34.82 ENCOUNTER FOR SUPERVISION OF OTHER NORMAL PREGNANCY IN SECOND TRIMESTER: Primary | ICD-10-CM

## 2023-07-03 PROCEDURE — 3074F SYST BP LT 130 MM HG: CPT | Performed by: ADVANCED PRACTICE MIDWIFE

## 2023-07-03 PROCEDURE — 0500F INITIAL PRENATAL CARE VISIT: CPT | Performed by: ADVANCED PRACTICE MIDWIFE

## 2023-07-03 PROCEDURE — 3078F DIAST BP <80 MM HG: CPT | Performed by: ADVANCED PRACTICE MIDWIFE

## 2023-07-03 RX ORDER — LIDOCAINE HYDROCHLORIDE 20 MG/ML
SOLUTION ORAL; TOPICAL
COMMUNITY
Start: 2023-01-05

## 2023-07-04 NOTE — PROGRESS NOTES
Here for NOB visit.  Patient's last menstrual period was 2023 (approximate). 2024, by Ultrasound 10w5d. Discussed changing MILADYS based on ultrasound dating. Discussed meds/measures to help with nausea. Denies pain or bleeding. Normal PE with pap done today      NOB labs- Normal  Genetic screening- Desires. NIPT kit given today. Denies carrier screen  Ultrasound: dating changed      Prior births:n/a    Warning signs reviewed. Heterosexual

## 2023-07-05 PROBLEM — N93.9 ABNORMAL UTERINE BLEEDING (AUB): Status: RESOLVED | Noted: 2018-09-13 | Resolved: 2023-07-05

## 2023-07-05 PROBLEM — B37.31 VULVOVAGINITIS DUE TO YEAST: Status: RESOLVED | Noted: 2020-09-10 | Resolved: 2023-07-05

## 2023-07-05 PROBLEM — N97.0 ANOVULATORY BLEEDING: Status: RESOLVED | Noted: 2019-05-15 | Resolved: 2023-07-05

## 2023-07-05 PROBLEM — Z34.90 PREGNANCY: Status: ACTIVE | Noted: 2023-07-05

## 2023-07-05 PROBLEM — N89.8 VAGINAL DISCHARGE: Status: RESOLVED | Noted: 2022-03-01 | Resolved: 2023-07-05

## 2023-07-05 PROBLEM — Z01.419 WELL WOMAN EXAM WITH ROUTINE GYNECOLOGICAL EXAM: Status: RESOLVED | Noted: 2020-09-10 | Resolved: 2023-07-05

## 2023-07-05 PROBLEM — D62 ACUTE POST-HEMORRHAGIC ANEMIA: Status: RESOLVED | Noted: 2019-05-08 | Resolved: 2023-07-05

## 2023-07-05 LAB
C TRACH DNA SPEC QL NAA+PROBE: NEGATIVE
N GONORRHOEA DNA SPEC QL NAA+PROBE: NEGATIVE

## 2023-07-31 ENCOUNTER — ROUTINE PRENATAL (OUTPATIENT)
Dept: OBGYN CLINIC | Facility: CLINIC | Age: 23
End: 2023-07-31

## 2023-07-31 VITALS — DIASTOLIC BLOOD PRESSURE: 73 MMHG | BODY MASS INDEX: 33 KG/M2 | SYSTOLIC BLOOD PRESSURE: 109 MMHG | WEIGHT: 164.38 LBS

## 2023-07-31 DIAGNOSIS — O99.210 OBESITY IN PREGNANCY: ICD-10-CM

## 2023-07-31 DIAGNOSIS — Z34.02 ENCOUNTER FOR SUPERVISION OF NORMAL FIRST PREGNANCY IN SECOND TRIMESTER: Primary | ICD-10-CM

## 2023-07-31 PROCEDURE — 0502F SUBSEQUENT PRENATAL CARE: CPT | Performed by: ADVANCED PRACTICE MIDWIFE

## 2023-07-31 PROCEDURE — 3074F SYST BP LT 130 MM HG: CPT | Performed by: ADVANCED PRACTICE MIDWIFE

## 2023-07-31 PROCEDURE — 3078F DIAST BP <80 MM HG: CPT | Performed by: ADVANCED PRACTICE MIDWIFE

## 2023-07-31 RX ORDER — LORATADINE 10 MG/1
10 TABLET ORAL DAILY
COMMUNITY
Start: 2023-03-11

## 2023-08-01 ENCOUNTER — TELEPHONE (OUTPATIENT)
Dept: OBGYN CLINIC | Facility: CLINIC | Age: 23
End: 2023-08-01

## 2023-08-01 NOTE — PROGRESS NOTES
Hernesto Jerald is feeling well. Denies LOF, vaginal bleeding, pelvic pain. Discussed anatomy scan and instructed patient to schedule. Reviewed warning signs and when to call.  RACHEL 4wks

## 2023-08-01 NOTE — TELEPHONE ENCOUNTER
Received test results from [facility], placed in [place] [id number] for review

## 2023-08-02 ENCOUNTER — TELEPHONE (OUTPATIENT)
Dept: OBGYN CLINIC | Facility: CLINIC | Age: 23
End: 2023-08-02

## 2023-08-02 NOTE — TELEPHONE ENCOUNTER
Per lab report \"test not performed. \" Scar Porter and spoke with [facility]. Per [facility] wrong test tubes were received for Panorama and pt will have to be redrawn. Pt notified and pt will  new order form and kit. Pt agreed and voiced understanding.

## 2023-08-02 NOTE — TELEPHONE ENCOUNTER
Calling to verify   pt called saying she was to have panorama ,  Or horizon test ,  Please call to inform they what test is being ri=un and they need more information

## 2023-08-03 ENCOUNTER — TELEPHONE (OUTPATIENT)
Dept: OBGYN CLINIC | Facility: CLINIC | Age: 23
End: 2023-08-03

## 2023-08-04 NOTE — TELEPHONE ENCOUNTER
Demian Lobato called and informed that they received [employer] and not [facility] NIPT test kit. Advised pt will be coming to  her kit for NIPT testing sometime this week. They will discard the wrong tubes they received. No further questions.

## 2023-08-23 ENCOUNTER — TELEPHONE (OUTPATIENT)
Dept: OBGYN CLINIC | Facility: CLINIC | Age: 23
End: 2023-08-23

## 2023-08-28 ENCOUNTER — TELEPHONE (OUTPATIENT)
Dept: OBGYN CLINIC | Facility: CLINIC | Age: 23
End: 2023-08-28

## 2023-08-29 ENCOUNTER — ROUTINE PRENATAL (OUTPATIENT)
Dept: OBGYN CLINIC | Facility: CLINIC | Age: 23
End: 2023-08-29

## 2023-08-29 VITALS
WEIGHT: 168 LBS | BODY MASS INDEX: 34 KG/M2 | HEART RATE: 90 BPM | DIASTOLIC BLOOD PRESSURE: 71 MMHG | SYSTOLIC BLOOD PRESSURE: 107 MMHG

## 2023-08-29 DIAGNOSIS — Z34.02 ENCOUNTER FOR SUPERVISION OF NORMAL FIRST PREGNANCY IN SECOND TRIMESTER: Primary | ICD-10-CM

## 2023-08-29 PROCEDURE — 0502F SUBSEQUENT PRENATAL CARE: CPT | Performed by: ADVANCED PRACTICE MIDWIFE

## 2023-08-29 PROCEDURE — 3078F DIAST BP <80 MM HG: CPT | Performed by: ADVANCED PRACTICE MIDWIFE

## 2023-08-29 PROCEDURE — 3074F SYST BP LT 130 MM HG: CPT | Performed by: ADVANCED PRACTICE MIDWIFE

## 2023-09-08 ENCOUNTER — HOSPITAL ENCOUNTER (OUTPATIENT)
Dept: PERINATAL CARE | Facility: HOSPITAL | Age: 23
Discharge: HOME | End: 2023-09-08
Attending: ADVANCED PRACTICE MIDWIFE
Payer: MEDICAID

## 2023-09-08 ENCOUNTER — HOSPITAL ENCOUNTER (OUTPATIENT)
Dept: PERINATAL CARE | Facility: HOSPITAL | Age: 23
Discharge: HOME | End: 2023-09-08
Attending: OBSTETRICS & GYNECOLOGY
Payer: MEDICAID

## 2023-09-08 VITALS
BODY MASS INDEX: 34 KG/M2 | DIASTOLIC BLOOD PRESSURE: 76 MMHG | SYSTOLIC BLOOD PRESSURE: 117 MMHG | HEART RATE: 106 BPM | WEIGHT: 169 LBS

## 2023-09-08 DIAGNOSIS — O99.212 OTHER OBESITY AFFECTING PREGNANCY IN SECOND TRIMESTER: Primary | ICD-10-CM

## 2023-09-08 DIAGNOSIS — E66.8 OTHER OBESITY AFFECTING PREGNANCY IN SECOND TRIMESTER: Primary | ICD-10-CM

## 2023-09-08 DIAGNOSIS — O99.210 OBESITY IN PREGNANCY: ICD-10-CM

## 2023-09-08 PROCEDURE — 76811 OB US DETAILED SNGL FETUS: CPT | Performed by: OBSTETRICS & GYNECOLOGY

## 2023-09-29 ENCOUNTER — ROUTINE PRENATAL (OUTPATIENT)
Dept: OBGYN CLINIC | Facility: CLINIC | Age: 23
End: 2023-09-29

## 2023-09-29 VITALS
BODY MASS INDEX: 36 KG/M2 | SYSTOLIC BLOOD PRESSURE: 109 MMHG | WEIGHT: 176 LBS | DIASTOLIC BLOOD PRESSURE: 71 MMHG | HEART RATE: 89 BPM

## 2023-09-29 DIAGNOSIS — Z34.02 ENCOUNTER FOR SUPERVISION OF NORMAL FIRST PREGNANCY IN SECOND TRIMESTER: Primary | ICD-10-CM

## 2023-09-29 DIAGNOSIS — Z23 NEED FOR VACCINATION: ICD-10-CM

## 2023-09-29 PROCEDURE — 90686 IIV4 VACC NO PRSV 0.5 ML IM: CPT | Performed by: ADVANCED PRACTICE MIDWIFE

## 2023-09-29 PROCEDURE — 3074F SYST BP LT 130 MM HG: CPT | Performed by: ADVANCED PRACTICE MIDWIFE

## 2023-09-29 PROCEDURE — 90471 IMMUNIZATION ADMIN: CPT | Performed by: ADVANCED PRACTICE MIDWIFE

## 2023-09-29 PROCEDURE — 3078F DIAST BP <80 MM HG: CPT | Performed by: ADVANCED PRACTICE MIDWIFE

## 2023-09-29 PROCEDURE — 0502F SUBSEQUENT PRENATAL CARE: CPT | Performed by: ADVANCED PRACTICE MIDWIFE

## 2023-09-29 NOTE — PROGRESS NOTES
Noemi Ramos, is at 23w0d, here for her RACHEL visit. Currently, she is feeling well. Denies 2nd trimester danger signs. Accepts flu shot today. Vital signs and weight reviewed  See flowsheets  Anatomy scan WNL and reviewed with patient    Assessment/Plan: flu shot given  Measuring large for dates today but recent scan was WNL. Will continue to monitor and consider 3T growth  Next visit: 5 weeks. 3T labs and Tdap then    Reviewed:   Prenatal visit schedule  Recommendations and rationale for flu and COVID vaccine(s) in pregnancy   labor precautions  Kick counts  Danger signs    Pt verbalized understanding. All questions answered.  No barriers to learning identified 5

## 2023-10-27 ENCOUNTER — LAB ENCOUNTER (OUTPATIENT)
Dept: LAB | Age: 23
End: 2023-10-27
Attending: ADVANCED PRACTICE MIDWIFE

## 2023-10-27 DIAGNOSIS — Z34.02 ENCOUNTER FOR SUPERVISION OF NORMAL FIRST PREGNANCY IN SECOND TRIMESTER: ICD-10-CM

## 2023-10-27 LAB
DEPRECATED RDW RBC AUTO: 39.8 FL (ref 35.1–46.3)
ERYTHROCYTE [DISTWIDTH] IN BLOOD BY AUTOMATED COUNT: 13.5 % (ref 11–15)
GLUCOSE 1H P GLC SERPL-MCNC: 130 MG/DL
HCT VFR BLD AUTO: 37.4 %
HGB BLD-MCNC: 12.2 G/DL
MCH RBC QN AUTO: 26.5 PG (ref 26–34)
MCHC RBC AUTO-ENTMCNC: 32.6 G/DL (ref 31–37)
MCV RBC AUTO: 81.3 FL
PLATELET # BLD AUTO: 275 10(3)UL (ref 150–450)
RBC # BLD AUTO: 4.6 X10(6)UL
WBC # BLD AUTO: 11 X10(3) UL (ref 4–11)

## 2023-10-27 PROCEDURE — 82950 GLUCOSE TEST: CPT

## 2023-10-27 PROCEDURE — 36415 COLL VENOUS BLD VENIPUNCTURE: CPT

## 2023-10-27 PROCEDURE — 85027 COMPLETE CBC AUTOMATED: CPT

## 2023-10-27 PROCEDURE — 87389 HIV-1 AG W/HIV-1&-2 AB AG IA: CPT

## 2023-10-27 PROCEDURE — 86780 TREPONEMA PALLIDUM: CPT

## 2023-10-30 LAB — T PALLIDUM AB SER QL: NEGATIVE

## 2023-11-02 ENCOUNTER — ROUTINE PRENATAL (OUTPATIENT)
Dept: OBGYN CLINIC | Facility: CLINIC | Age: 23
End: 2023-11-02
Payer: MEDICAID

## 2023-11-02 ENCOUNTER — TELEPHONE (OUTPATIENT)
Dept: OBGYN CLINIC | Facility: CLINIC | Age: 23
End: 2023-11-02

## 2023-11-02 VITALS
BODY MASS INDEX: 37 KG/M2 | SYSTOLIC BLOOD PRESSURE: 105 MMHG | WEIGHT: 182.19 LBS | DIASTOLIC BLOOD PRESSURE: 69 MMHG | HEART RATE: 93 BPM

## 2023-11-02 DIAGNOSIS — R73.09 ELEVATED GLUCOSE TOLERANCE TEST: Primary | ICD-10-CM

## 2023-11-02 DIAGNOSIS — Z34.02 ENCOUNTER FOR SUPERVISION OF NORMAL FIRST PREGNANCY IN SECOND TRIMESTER: Primary | ICD-10-CM

## 2023-11-02 PROCEDURE — 3074F SYST BP LT 130 MM HG: CPT | Performed by: ADVANCED PRACTICE MIDWIFE

## 2023-11-02 PROCEDURE — 3078F DIAST BP <80 MM HG: CPT | Performed by: ADVANCED PRACTICE MIDWIFE

## 2023-11-02 PROCEDURE — 99213 OFFICE O/P EST LOW 20 MIN: CPT | Performed by: ADVANCED PRACTICE MIDWIFE

## 2023-11-02 NOTE — PROGRESS NOTES
Zaki Marie is a 26year old , at 31w5d, here for her return OB visit. Currently, she is feeling well. Denies contractions and bleeding. Endorses active fetus. Has been feeling \"wet\" on and off for the past month. Reports it feels like she peed herself but she knows she didn't and it doesn't smell like urine. She doesn't feel liquid coming out but just feels underwear and panty liner are wet. This happens at least 3x/week. There is no odor or irritation and its clear fluid. Vital signs and weight reviewed  See flowsheets  SSE negative for pooling, nitrazine negative. Profuse physiologic discharge in vagina. Today's Assessment/Plan:   Vaginal cultures sent   Discussed completing 3h GTT as soon as possible  Discussed Tdap for next visit and 28w packet provided to patient. Next visit: Follow up OB: 2 weeks    Reviewed:   3rd trimester precautions and expectations   labor precautions  Kick counts  Danger signs  Prenatal visit schedule    Pt verbalized understanding. All questions answered.  No barriers to learning identified

## 2023-11-02 NOTE — TELEPHONE ENCOUNTER
----- Message from Zuri Corral CNM sent at 11/1/2023  5:11 PM CDT -----  Please notify patient of need for fasting 3hr GTT and A1C and place orders accordingly. You can let her know her other 3T labs were WNL including a negative HIV result.

## 2023-11-03 LAB
C TRACH DNA SPEC QL NAA+PROBE: NEGATIVE
N GONORRHOEA DNA SPEC QL NAA+PROBE: NEGATIVE

## 2023-11-04 LAB
GENITAL VAGINOSIS SCREEN: NEGATIVE
TRICHOMONAS SCREEN: NEGATIVE

## 2023-11-15 NOTE — TELEPHONE ENCOUNTER
----- Message from Luan Chamberlain CNM sent at 11/15/2023  8:39 AM CST -----  Please notify pt by phone that she did not pass her 3hr GTT and has gestational diabetes. She will need diabetic counseling and to return to our office with her blood sugar log in 1 week after counseling appt.

## 2023-11-15 NOTE — TELEPHONE ENCOUNTER
Name and  verified    Patient given TM results and recommendations and verbalized understanding. Patient given scheduling and follow up information. Order placed.

## 2023-11-16 NOTE — PROGRESS NOTES
David Laurent is a 26year old , at 33w8d, here for her return OB visit. Currently, she is feeling well. Denies contractions, bleeding, and leakage of fluid. Endorses active fetus. She has diabetic education appointment scheduled for . Vital signs and weight reviewed  See flowsheets    Today's Assessment/Plan:   Tdap recommendation reviewed and pt accepts  Gestational diabetes diagnosis reviewed    Next visit: Follow up OB: 2 weeks    Reviewed:   3rd trimester precautions and expectations   labor precautions  Kick counts  Danger signs  Prenatal visit schedule    Pt verbalized understanding. All questions answered.  No barriers to learning identified

## 2023-11-22 NOTE — PROGRESS NOTES
Corrine Fu  : 3/17/2000 was seen for Gestational Diabetes Counseling: Individual/Group    Date: 2023   Start time: 9:45 am End time: 11:45 am    Obtained usual diet history: Patient follows a general diet. Eats 2 meals and 2 snacks a day enjoys sweets daily    Education:     GDM Overview:  Reviewed gestational diabetes as diagnosis including target blood glucose values. Benefits, risks, and management options for improving/maintaining glucose control to mother/baby discussed. Instructed /demonstrated ability to perform blood glucose testing on: contour next EZ    Healthy Eating:  Discussed nutrition concepts for pregnancy/healthy eating and effects of food on BG value. Timing of meals; what is a carbohydrate, protein, fat. Taught: Carb counting, label reading, meal planning. Suggested Calorie Level: 2000    Being Active:  Benefits and effects of activity on BG discussed. Monitoring:  Instructed on how to use glucose monitor/proper lancet disposal. Testing schedules are:   Fastin-95 mg/dL, Call MD if >95 mg/dL twice in 1 week   2 Hour Post Prandial:  120 md/dL, Call MD if >120 mg/dL twice in 1 week. Taking Medication:  Reviewed when medication might be indicated. Reducing Risk:  Effects of elevated blood glucose on mother/baby reviewed. Discussed management (hyperglycemia, hypoglycemia, sick day, DKA, other) and when to call provider. Post pregnancy management/prevention of Type 2 DM, and increased risk of having diabetes later in life reviewed. Healthy Coping:  Family involvement/social support encouraged. Identification of lifestyle behaviors willing to change discussed. Training Tools Provided:  Printed materials covering each topic provided. Support Plan provided and reviewed. Patient Chosen Goals:      1. Follow recommended GDM meal plan. 2. Begin testing fasting glucose and 2 hours after meals   3. Bring glucose log to each MD office visit.    4. Encouraged activity if no restrictions. 5. Encouraged Ashlegih Kannan to call diabetes center with any questions or concerns. Patient verbalized understanding and has no further questions at this time.     Vladimir Ramírez RN

## 2023-11-29 NOTE — PROGRESS NOTES
Active fetus Denies any complaints. Denies any vaginal bleeding, leaking of fluid or vaginal discharge. No signs signs of PTL. Reviewed S&S of PTL  Warning signs reviewed  All questions answered.      GDM : started checking 5 days ago 20% of values abnormal  My Chart link sent Reviewed dietary intake  RSV / TDAP vaccine next visit  Growth ultrasound scheduled Questions re: trinh - RN to discuss with pt

## 2023-12-04 NOTE — TELEPHONE ENCOUNTER
Name and  verified    Patient will see Diabetes Educator on , also scheduled with MS on this date to review.

## 2023-12-06 NOTE — PROGRESS NOTES
Active fetus Denies any complaints. Denies any vaginal bleeding, leaking of fluid or vaginal discharge. No signs signs of PTL. Reviewed S&S of PTL  Warning signs reviewed  All questions answered. TDAP: completed  RSV: ordered no supply at office pt will RTO or next visit  GDM: last 3 days all sugars WNL  Pt reports she has altered her diet and can now tell which foods cause increase in blood sugars.   REviewed dietary changes and encouragement given

## 2023-12-12 NOTE — DISCHARGE INSTRUCTIONS
Return to emergency department for any worsening symptoms including but not limited to weakness, numbness, changes in mentation. Your symptoms seem most consistent with a viral illness. Please drink plenty of fluids and take plenty of rest.  Follow with your primary care provider in the next  few days for reevaluation. Please speak with your OB physician regarding your influenza    The Emergency Department is not intended to be a substitute for an effort to provide complete medical care. The imaging, if any, have often been interpreted on a preliminary basis pending final reading by the radiologist. If your blood pressure was greater than 140/90, please have this blood pressure rechecked by your primary care provider in the next several days.

## 2023-12-22 NOTE — PROGRESS NOTES
Zane Bliss, is at 35w0d, here for her RACHEL visit. Currently, she is feeling well. Denies 3rd trimester danger signs. Eating a full carb snack at dinner w/ a lot of milk. For instance, Nutella on toast.     Vital signs and weight reviewed  See flowsheets     Blood Sugar Log:  <20% overall elevated readings   <4 elevated fasting values in one week BUT values high after dinner. Other values well within range    Assessment/Plan: Reviewed low-carb dinner essential. Will attempt for 1 week and keep log  Next visit: 1 week. GBS then    Reviewed:   Prenatal visit schedule  Kick counts  Danger signs  Labor precautions    Pt verbalized understanding. All questions answered.  No barriers to learning identified

## 2023-12-29 NOTE — PROGRESS NOTES
Jose Alejandro Jiménez, is at 36w0d, here for her RACHEL visit. Currently, she is feeling well. Denies 3rd trimester danger signs. Has been able to adjust her dinners to help with those 2hr postparandials. Vital signs and weight reviewed  See flowsheets    Blood Sugar Log:  <20% overall elevated readings   <4 elevated fasting values in one week     Assessment/Plan: GBS sent. Glucose log sent for scan  Next visit: 1 week    Reviewed:   Prenatal visit schedule  Kick counts  Danger signs  Labor precautions    Pt verbalized understanding. All questions answered.  No barriers to learning identified

## 2024-01-11 NOTE — PROGRESS NOTES
Pt is a 23 year old female admitted to [facility].     Chief Complaint   Patient presents with    R/o Rom     Pt sent from office for r/o SROM; complaint of increased discharge for the last 2 weeks.      Pt is  37w6d intra-uterine pregnancy.  History obtained, consents signed. Oriented to room, staff, and plan of care.

## 2024-01-11 NOTE — PATIENT INSTRUCTIONS
Understanding Preeclampsia  Preeclampsia is high blood pressure (hypertension) that happens during pregnancy. It often shows up around the 20th week of pregnancy. It often goes back to normal by the 12th week after you give birth. It can lead to serious health risks for you and your baby. During your pregnancy, your healthcare provider will watch your blood pressure.    Symptoms  A common symptom of preeclampsia is high blood pressure. Other symptoms may include:  Rapid weight gain  Protein in your urine  Headache  Belly (abdominal) pain on your right side  Vision problems. These include flashes or spots.  Swelling (edema) in your face or hands. This also often happens near the end of normal pregnancies, even without preeclampsia.  Tests you may have  Your healthcare provider will want to check your blood pressure throughout your pregnancy. If your blood pressure is high, you may have the following tests:  Urine tests to look for protein  Blood tests to confirm preeclampsia  Fetal monitoring to make sure that your baby is healthy  Treating preeclampsia  You may need to take a daily low dose of aspirin if you are at risk for preeclampsia. Preeclampsia almost always ends soon after you give birth. Until then, your healthcare provider can help manage your condition. If your symptoms are mild, you may need activity limits at home, including bed rest and no heavy lifting. If your symptoms are severe, you will stay in the hospital. Hospital treatment includes:  Activity limits to help control blood pressure. This means no heavy lifting and 8 hours per day lying down with the feet up.  Magnesium IV (intravenous) drip during labor to prevent seizures  Induced labor or surgical delivery by  section. Delivery is considered the cure for preeclampsia.  When to call your healthcare provider  Call your healthcare provider if swelling, weight gain, or other symptoms come on quickly or are severe. Some cases of  preeclampsia are more severe than others. Your symptoms also may change or get worse as you get closer to your due date.  Who’s at risk?  No one knows what causes preeclampsia. Preeclampsia can happen in any pregnant woman. But it is more common in first-time pregnancies. Things that increase the risk include:  Previous pregnancies. You are at risk if you had preeclampsia, intrauterine growth retardation (IUGR),  birth, placental abruption, or fetal death in a past pregnancy.  Health history of mother. You are at risk if you have diabetes, high blood pressure, obesity, kidney disease, autoimmune disease such as lupus, or a family history of preeclampsia.  Current pregnancy. You are at risk if this is your first pregnancy, or if you have multiple fetuses, are younger than age 18 or older than 40, or used in vitro fertilization.  Race. You are at risk if you are black.  Dangers of preeclampsia  If not treated, preeclampsia can cause problems for you and your baby. The placenta is the organ that nourishes your baby. It may tear away from the uterine wall. This can put the baby at risk for health problems (fetal distress) and premature birth. Preeclampsia can also cause these health problems:  Kidney failure or other organ damage  Seizures  Stroke  Once you give birth  In most cases, preeclampsia goes away on its own soon after you give birth. This is often by the 12th week after you give deliver. Within days of delivery, your blood pressure, swelling, and other symptoms should get better. For some women, problems from preeclampsia can continue after delivery.  Postpartum preeclampsia that develops within the first 48 hours after delivery is rare. Another type of postpartum preeclampsia that develops more than 48 hours after delivery is called late-onset preeclampsia. It is also rare. Contact your healthcare provider right away if you have symptoms of preeclampsia after you deliver.  Roxy last reviewed this  educational content on 12/1/2019 © 2000-2020 [employer]. [address] [place] [id number]. All rights reserved. This information is not intended as a substitute for professional medical care. Always follow your healthcare professional's instructions.        Kick Counts    It’s normal to worry about your baby’s health. One way you can know your baby’s doing well is to record the baby’s movements once a day. This is called a kick count. Remember to take your kick count records to all your appointments with your healthcare provider.  How to count kicks  Time how long it takes you to feel 10 kicks, flutters, swishes, or rolls. Ideally, you want to feel at least 10 movements within 2 hours. You will likely feel 10 movements in less time than that.  Starting at 28 weeks, count your baby's movements daily. Follow your healthcare provider's instructions for kick counting. Here are tips for counting kicks:  Choose a time when the baby is active, such as after a meal.   Sit comfortably or lie on your side.   The first time the baby moves, write down the time.   Count each movement until the baby has moved 10 times. This can take from 20 minutes to 2 hours.   If you have not felt 10 kicks by the end of the second hour, wait a few hours. Then try again.  Try to do it at the same time each day.  When to call your healthcare provider  Call your healthcare provider right away if:  You do a couple sets of kick counts during the day and your baby moves fewer than 10 times in 2 hours  Your baby moves much less often than on the days before.  You have not felt your baby move all day.  [email or web address] last reviewed this educational content on 12/1/2017 © 2000-2020 [employer]. [address] [place] [address]. All rights reserved. This information is not intended as a substitute for professional medical care. Always follow your healthcare professional's instructions.        Recognizing  Labor    The beginning of labor is the beginning of birth. You’ll start to feel strong contractions. That’s when the muscles of your uterus tighten up to help push your baby out during birth.  Yes, labor has probably started   Signs of labor include:  Your contractions are getting stronger and more painful instead of weaker. You’ll probably feel them throughout your whole uterus.  Your contractions are regular. This means that you feel them about every 5 to 10 minutes. And they are getting closer together.  You have pink-colored or blood-streaked fluid from your vagina.  You feel that the baby has \"dropped\" lower in your pelvis   Your water breaks. It may be a gush or a slow trickle of clear fluid from your vagina.  No, it’s probably not real labor   Signs of false labor include:  Your contractions aren’t regular or strong.  You feel the contractions only in your lower uterus.  Your contractions go away when you walk or change position.  Your contractions go away after drinking fluids.  When to call your healthcare provider  Call your healthcare provider or clinic right away if you notice any of these signs:  Fluid from your vagina, with or without contractions.  Bleeding heavy enough that you need a sanitary pad.  You don’t feel your baby moving as much as before.     NOTE: Contractions are timed by both of these measures:  The length of each contraction from its start to its finish.  How far apart the contractions are--the time between the start of one contraction and the start of the next contraction.   [employer] last reviewed this educational content on 10/1/2017  © 7101-8684 [employer]. [address] [place] [address], [place] [address]. All rights reserved. This information is not intended as a substitute for professional medical care. Always follow your healthcare professional's instructions.        Stages of Labor    Labor has 3 stages. Your healthcare provider may talk about the progress of your labor  with certain words. One of these is your baby’s position. Another is your baby’s station. And the effacement and dilation of your cervix will be noted. Read below to learn about these terms and the 3 stages of labor.  Your baby moves into position  Position is your baby’s placement in your uterus. Your baby may be facing left or right. He or she may be head first or feet first. Station refers to how far your baby has moved down into your pelvic cavity.  First stage of labor  During the first stage of labor, contractions of the uterus help your cervix thin (efface). They also help it widen (dilate). This will help your baby pass through the birth canal (vagina). At first your contractions will not come that often or last that long. But as time passes, they will come more often, they may be more painful, and they will last longer. They will last about 30 to 60 seconds each. The first stage of labor lasts until the cervix is fully dilated.  Second stage of labor  When your cervix is fully dilated, the second stage of labor begins. In this stage, you will have stronger contractions of your uterus that will help your baby move down the birth canal. They may happen every 2 to 5 minutes. They may last from 45 to 90 seconds each. Your healthcare provider will ask you to push with each contraction. Try to rest between the contractions if you can. Your baby is delivered at the end of this stage of labor.  Third stage of labor  The third stage of labor comes after your baby is born. This is when the afterbirth (placenta) comes out of your uterus. Your uterus will continue to contract. But the contractions are much milder than before.  [employer]Well last reviewed this educational content on 10/1/2017  © 4155-4451 [employer], [employer]. [address] [place] [address], [place] [address]. All rights reserved. This information is not intended as a substitute for professional medical care. Always follow your healthcare professional's  instructions.   Labor and Childbirth: Thinking About a Birth Plan    A birth plan outlines your wishes for labor and birth. It helps your healthcare providers know what you want and expect. But be aware that labor is a series of changing conditions and your birth plan may need to change at the last minute. Work with your healthcare provider to create a plan that leaves room for the unexpected.  Your support team  The team that helps you plan your childbirth may include:  Healthcare provider/certified nurse-midwife. He or she gives prenatal care (care during your pregnancy) and delivers your baby.  Labor nurse. This is a nurse who assists during labor and birth.  Anesthesiologist. This healthcare provider can provide medicine for pain control if you need it.  Support person. This is a person who helps with your emotional and physical comfort during labor. It might be your partner, a family member, or a friend.  Labor  or . This person provides nonmedical advice and support.  Questions to think about  Birth preparation classes can help you think about what to include in your birth plan. When making your plan, ask yourself:  What type of room will I give birth in?  Do I want to be able to walk around during labor and choose labor positions?  What types of comfort measures do I want? Massage, acupressure, birth balls, or music?  Who do I want for my support people? What will their roles be? Who will be with me in the delivery room?  What are my choices for managing pain during labor and birth? How will medicines for pain affect my baby and my labor?  Do I want continuous fetal monitoring?     What types of medicines and IV fluids will I allow to assist me with labor?  What types of procedures or medicines (if any) will I allow to speed up the labor process?     What type of care and length of hospital stay will my health plan cover?  What choices would I consider should unexpected circumstances develop?  If I  had a  in the past, is  (vaginal birth after ) a choice?  Do I want immediate contact with my baby after birth with no separation?  How do I want to feed my baby? Breastfeeding only, or will I allow some formula?  Do I want to delay any medicines or immunizations right after my baby is born?  Roxy last reviewed this educational content on 2018  © 5533-4695 [employer]. [address] [place] [address]. All rights reserved. This information is not intended as a substitute for professional medical care. Always follow your healthcare professional's instructions.        Labor and Childbirth: Your Body Prepares  Labor is the series of uterine contractions that dilate (open) and efface (thin) your cervix for birth. Your due date is a guide to when labor will begin, but babies often come days or weeks before or after due dates. Even so, labor need not take you by surprise. In the last weeks of pregnancy, you or your healthcare provider may notice changes that mean labor is near.  Changes in your body  Physical changes often signal that your baby will soon be born:  Discharge from your vagina may increase and become thicker. You may notice a pink or brownish discharge called the bloody show.  The mucous plug may break down over a few weeks or all at once. Losing the plug doesn’t mean that labor will start right away.  You may feel Wilkinson Kemp contractions (false labor). These irregular contractions start to soften and thin the cervix. Many women mistake these contractions for true labor. They may be more noticeable towards the end of the day.  Feeling like the baby has dropped lower. In preparation for birth, the baby's head has settled deep into your pelvis.  Roxy last reviewed this educational content on 2018  © 5426-5913 [employer]. [address] [place] [address]. All rights reserved. This information is not intended as a substitute  for professional medical care. Always follow your healthcare professional's instructions.        Managing Labor Pain Without Medicine  There are many ways to manage pain during labor. It can often be done with no anesthesia or strong pain medicines. Talk to your healthcare provider about any choices you would like to explore.  Help from relaxation  Some of these are learned in special classes. Your healthcare provider can help you find classes. The hospital or birth center may have a tub or shower you can use during early labor. These methods may be of help to you:  Breathing techniques  A warm tub or shower between contractions  Massage and therapeutic touch by your support person or labor   Reading materials that are comforting or inspiring  Music that is soothing  Hypnosis  Acupuncture and acupressure  Heat and cold application  Aromatherapy  Birth ball  Non-pharmacologic treatment  Injections of sterile water by your healthcare provider can give you temporary pain relief when injected in the back.  If you need medicine  You may plan to use little or no medicine. But you may change your mind during labor. You can ask for medicine at any time if you need it.  [employer] last reviewed this educational content on 2/1/2018 © 2000-2020 [employer]. [address] [place] [address]. All rights reserved. This information is not intended as a substitute for professional medical care. Always follow your healthcare professional's instructions.        Anesthesia Options for Labor    Anesthesia is a type of medicine to prevent pain. It is often used in labor. It may numb only one region of your body. This is called regional anesthesia. Or it may let you sleep during surgery. This is called general anesthesia. Only a trained specialist gives this type of medicine. When possible, your healthcare provider will use regional anesthesia. This is so you can be awake during your baby’s birth. The type of  anesthetic you have may depend on the hospital guidelines.  Regional anesthesia  Your healthcare provider may use regional anesthesia to numb your lower body for a vaginal or  birth. It does not go into your bloodstream. This means that little or none of it will reach your baby. There are two kinds:  Epidural. This is most often given while you sit up or lie on your side. A needle with a flexible tube (catheter) is put into your lower back. The needle is then removed. The anesthetic is sent through the catheter. A pump may be attached. This gives you a constant level of anesthetic. An epidural often affects only part of your muscle control. This means you should still be able to push for a vaginal birth.  Spinal. This is most often given in one dose right before delivery. Your healthcare provider may use it for a  birth. It acts fast. You may sit up or lie down when it is injected. It may affect muscle control in your lower body. This includes your ability to push.  General anesthesia  General anesthesia lets you sleep and keeps you free from pain during surgery. It is most often used for an emergency  birth. Your healthcare provider may use it for a  birth. He or she may give it to you as an injection, as an inhaled gas, or as both. Delivery often happens before the medicine has reached the baby.        [employer] last reviewed this educational content on 10/1/2017  © 4322-5325 [employer], [employer]. [address] [place] [address], [place] [address]. All rights reserved. This information is not intended as a substitute for professional medical care. Always follow your healthcare professional's instructions.        Pregnancy and Childbirth: What to Bring to the Hospital    You’re likely feeling anxious as your child’s birth approaches. This is normal. To give yourself some peace of mind, pack a bag ahead of time. Do this about one month ahead of your estimated delivery date. Here is a list  of things to remember:  Personal care items, like a toothbrush, hair brush, lip balm, lotion, and shampoo  Eyeglasses (if you wear them)  Nightgown (if you plan to breastfeed, pack one that allows for nursing)  Nursing bra if you plan to breastfeed  Bathrobe and slippers  Many hospitals provide maternity underwear, but you may want to bring underwear that can be soiled because you will have bleeding after delivery  Comfortable clothes for you to wear home, like sweat pants, yoga pants, or other stretchable clothes, because your prepregnancy clothes may not fit after delivery of your baby  Clothes for your baby to wear home  Personal music player and headphones  Camera with new batteries or   Coins for vending machines  Telephone numbers of people to call after the birth  Cell phone and   Insurance information and any other paperwork needed for your hospital stay  A list of baby names you are considering  An infant, rear-facing car seat for bringing home your baby (this is required by law)  Add anything else that you don’t want to forget:  _____________________________________  _____________________________________  _____________________________________  _____________________________________  _____________________________________  _____________________________________  _____________________________________  Roxy last reviewed this educational content on 12/1/20172017 © 2000-2020 [employer]. [address] [place] [address], [place] [address]. All rights reserved. This information is not intended as a substitute for professional medical care. Always follow your healthcare professional's instructions.        Labor: Preparing for the Hospital    During the final weeks of your pregnancy, you may have irregular contractions. You may feel a drop in your baby’s position. And the profile of your stomach may look a little different. Because due dates are not exact, have your bag packed and ready for the  hospital.  Packing for the hospital  Here are some items you may want to have ready for the hospital:  A watch or clock with a second hand  Insurance card and identification  Nursing bra, nursing pads, and maternity underwear  Toiletries  Something to entertain yourself, like books or a handheld computer  Heavy socks or slippers and a robe  Clips for your hair, a brush, and lip balm  An MP3 or other music player  A camera or video camera and   Important phone numbers or email addresses  Going-home outfits for you and your baby  A car seat to take your baby home in  Leaving for the hospital  Follow the instructions you’ve been given on when to leave for the hospital. You may be told to call your healthcare provider when it becomes hard to walk or talk during contractions or if your amniotic sac breaks (this causes a gush of water). If your partner makes the phone call for you, be nearby. That way, your healthcare provider can speak to you directly. Many women are told to go to the hospital after an hour of contractions that come 3 to 5 minutes apart. Leave sooner if the hospital is not nearby or is hard to get to.  [employer]Well last reviewed this educational content on 1/1/2018  © 0104-7983 [employer]. [address] [place] [address]. All rights reserved. This information is not intended as a substitute for professional medical care. Always follow your healthcare professional's instructions.        False Labor  If your pregnancy is at 37 weeks or longer and you are having contractions that are not true labor, you are having false labor. This means that it is not time yet to give birth to your baby.  True labor contractions can start unevenly but soon take on a regular pattern. As time goes on, the contractions will get stronger. Also, the intervals between the contractions will get shorter. Even at the onset, these contractions last at least 30 seconds and may increase to a minute. True  labor contractions often start in the back and then move to the front.  False labor contractions can be strong, frequent, and painful, but there is no regular pattern. The intensity can vary from strong to mild to strong again. False labor contractions are most often felt in the front. While true labor contractions don’t stop no matter what you are doing, false labor contractions may stop on their own or when you rest or move around.  False labor contractions might make you feel anxious or lose sleep. However, they don’t mean that you are sick or that anything is wrong with your baby. You don’t need to take any medicine for false labor.  Sometimes, it may be too hard to tell false labor from true labor. In such cases, you may need to have a vaginal exam. This allows your healthcare provider to check for changes in the cervix that only occur with true labor.  Home care  It may help to drink plenty of water and take warm baths. Do what you can ahead of time to prepare for giving birth so you’ll have less to worry about later.  Keep a record of your contractions. Write down what time each one starts and how long it lasts. A stopwatch is helpful. Look for the pattern of regularly spaced out contractions with a gradual increase in the time each one lasts.  Don’t be embarrassed about going to the hospital with a “false alarm.” Think of it as good practice for the real thing.    Follow-up care  Follow up with your healthcare provider, or as advised. If you are very worried, confused, can’t eat or sleep, or have questions about your health or pregnancy, schedule an appointment with your provider.  When to seek medical advice  Call your healthcare provider right away if any of these occur:  You are fewer than 37 weeks along in your pregnancy and you’re having contractions.  You have contractions that are regular, getting longer, stronger, and closer together.  Your water breaks.  You have vaginal bleeding.  You feel a  decrease in your baby’s movement or any other unusual changes.   You’re not sure if you are having false or true labor.  Roxy last reviewed this educational content on 6/1/2018  © 0569-9312 [employer]. [address] [place] [address], [place] [address]. All rights reserved. This information is not intended as a substitute for professional medical care. Always follow your healthcare professional's instructions.

## 2024-01-11 NOTE — PROGRESS NOTES
Ruthie is a 23 year old , at 37w6d, here for her return OB visit.  Currently, she is feeling well. Denies contractions and bleeding. Endorses active fetus. Reports for the past 1-2 weeks she has been leaking a lot of water from her vagina every day. It is so much now that it is soaking through a panty liner and causing a rash on the inside of her leg. Reports it is clear and watery and leaks on and off throughout the day.   She stopped checking her blood sugars after the diabetic education visit because she thought that's what she told her.    Vital signs and weight reviewed  See flowsheets       Today's Assessment/Plan:   Discussed importance of continuing to check blood sugar QID. Pt will resume checking and bring log to next visit.  Pt sent to triage for r/o PROM as we do not have a microscope in the office to check for ferning.  Birth plan reviewed:    Support: Boyfriend & cousin  Pain management: unmedicated  Vitamin K: yes  Erythromycin ointment: yes  Placenta: discard  Circumcision: ??  Peds: [facility] peds  Feeding method: breast  Housing/car seat: has  Contraception: POPs as a bridge to COCPs      Next visit: Follow up OB: 1 week if not PROM    Reviewed:   3rd trimester precautions and expectations  Labor precautions  Kick counts  Danger signs  Prenatal visit schedule      Pt verbalized understanding. All questions answered. No barriers to learning identified

## 2024-01-11 NOTE — TRIAGE
[facility]      Triage Note    Ruthie Morrow Patient Status:  Outpatient    3/17/2000 MRN [id number]   Location [facility] Attending Laurita Michel CNM   Hosp Day # 0 PCP NORMAN BURNETT MD      Para:   Estimated Date of Delivery: 24  Gestation: 37w6d    Chief Complaint    R/o Rom         Allergies:  No Known Allergies    Orders Placed This Encounter   Procedures    POCT Ferning       Lab Results   Component Value Date    WBC 11.0 10/27/2023    HGB 12.2 10/27/2023    HCT 37.4 10/27/2023    .0 10/27/2023    CREATSERUM 0.49 (L) 2023    BUN 8 2023     2023    K 4.1 2023     2023    CO2 23.0 2023    GLU 99 2023    CA 9.2 2023    ALB 3.3 (L) 2023    ALKPHO 91 2023    BILT 0.2 2023    TP 6.9 2023    AST 16 2023    ALT 23 2023    PTT 27.0 2019    INR 1.09 2019    TSH 1.12 2018       Clinitek UA  Lab Results   Component Value Date    URCOLOR Yellow 2022    URCLA Clear 2022    GLUUR Normal 2023    URINEBILI Negative 2022    URINEKETONE Negative 2022    SPECGRAVITY 1.018 2023    PHUR 8.5 (A) 2022    PROTURINE Negative 2022    UROBILI 4.0 (A) 2022    URINENITRITE Negative 2022    URINELEUK Trace (A) 2022    URINECUL  2023     <10,000 cfu/ml Mixture of Gram positive organisms isolated - probable contamination.       UA  Lab Results   Component Value Date    COLORUR Yellow 2023    CLARITY Clear 2023    SPECGRAVITY 1.018 2023    PROUR Negative 2023    GLUUR Normal 2023    KETUR Negative 2023    BILUR Negative 2023    BLOODURINE 2+ (A) 2023    NITRITE Negative 2023    UROBILINOGEN Normal 2023    LEUUR Negative 2023    UASA Negative 2019       Vitals:    24 1715   BP: 113/77   Pulse: 99        NST  Variability: Moderate           Accelerations: Yes           Decelerations: None            Baseline: 145 BPM           Uterine Irritability: No           Contractions: Irregular                                        Acoustic Stimulator: No           Nonstress Test Interpretation: Reactive           Nonstress Test Second Interpretation: Reactive          FHR Category: Category I             Additional Comments       Chief Complaint   Patient presents with    R/o Rom     Pt sent from office for r/o SROM; complaint of increased discharge for the last 2 weeks.   Pt reports good fetal movement. C/O increased discharge the last 2 weeks that soaks a liner. Sterile speculum exam revealed no pooling of fluid, negative amnioswab and negative for ferning. Yellow mucus discharge noted in vaginal vault. NST reactive. Findings reported to CHANTELL Michel CNM per telephone. Pt dc home with labor precautions and kick counts. Pt with verbalized understanding.     Caryn NG RN  1/11/2024 5:47 PM    Tracing reviewed, reactive.  Agree with above assessment.  Laurita Michel CNM

## 2024-01-18 NOTE — TRIAGE
[facility]      Triage Note    Ruthie Morrow Patient Status:  Outpatient    3/17/2000 MRN [id number]   Location [facility] Attending Mihaela Reeves CNM   Hosp Day # 0 PCP NORMAN BURNETT MD      Para:   Estimated Date of Delivery: 24  Gestation: 38w6d    Chief Complaint    Decreased Fetal Movement         Allergies:  No Known Allergies    No orders of the defined types were placed in this encounter.      Lab Results   Component Value Date    WBC 11.0 10/27/2023    HGB 12.2 10/27/2023    HCT 37.4 10/27/2023    .0 10/27/2023    CREATSERUM 0.49 (L) 2023    BUN 8 2023     2023    K 4.1 2023     2023    CO2 23.0 2023    GLU 99 2023    CA 9.2 2023    ALB 3.3 (L) 2023    ALKPHO 91 2023    BILT 0.2 2023    TP 6.9 2023    AST 16 2023    ALT 23 2023    PTT 27.0 2019    INR 1.09 2019    TSH 1.12 2018       Clinitek UA  Lab Results   Component Value Date    URCOLOR Yellow 2022    URCLA Clear 2022    GLUUR Normal 2023    URINEBILI Negative 2022    URINEKETONE Negative 2022    SPECGRAVITY 1.018 2023    PHUR 8.5 (A) 2022    PROTURINE Negative 2022    UROBILI 4.0 (A) 2022    URINENITRITE Negative 2022    URINELEUK Trace (A) 2022    URINECUL  2023     <10,000 cfu/ml Mixture of Gram positive organisms isolated - probable contamination.       UA  Lab Results   Component Value Date    COLORUR Yellow 2023    CLARITY Clear 2023    SPECGRAVITY 1.018 2023    PROUR Negative 2023    GLUUR Normal 2023    KETUR Negative 2023    BILUR Negative 2023    BLOODURINE 2+ (A) 2023    NITRITE Negative 2023    UROBILINOGEN Normal 2023    LEUUR Negative 2023    UASA Negative 2019       Vitals:    24 1145   BP: 112/76   BP  Location: Left arm   Pulse: 90   Resp: 18   Temp: 97.5 °F (36.4 °C)   TempSrc: Axillary       NST  Variability: Moderate           Accelerations: Yes           Decelerations: None            Baseline: 155 BPM           Uterine Irritability: No           Contractions: Irregular                                        Acoustic Stimulator: No           Nonstress Test Interpretation: Reactive           Nonstress Test Second Interpretation: Reactive          FHR Category: Category I             Additional Comments       Pt sent to ultrasound for JUSTA. JUSTA: 9.6. Pt report +FM. Pt instructions given w/ written and verbal labor and kick count precautions. Pt verbalized understanding.     Chief Complaint   Patient presents with    Decreased Fetal Movement     Pt sent from  office for NST and JUSTA. Pt states she hasn't been feeling baby movements as frequently.          Darrion FLOR RN  1/18/2024 1:20 PM

## 2024-01-18 NOTE — PROGRESS NOTES
Pt is a 23 year old female admitted to TR1/TR1-A.     Chief Complaint   Patient presents with    Decreased Fetal Movement     Pt sent from  office for NST and JUSTA. Pt states she hasn't been feeling baby movements as frequently.       Pt is  38w6d intra-uterine pregnancy.  History obtained, consents signed. Oriented to room, staff, and plan of care.

## 2024-01-18 NOTE — PATIENT INSTRUCTIONS
Understanding Preeclampsia  Preeclampsia is high blood pressure (hypertension) that happens during pregnancy. It often shows up around the 20th week of pregnancy. It often goes back to normal by the 12th week after you give birth. It can lead to serious health risks for you and your baby. During your pregnancy, your healthcare provider will watch your blood pressure.    Symptoms  A common symptom of preeclampsia is high blood pressure. Other symptoms may include:  Rapid weight gain  Protein in your urine  Headache  Belly (abdominal) pain on your right side  Vision problems. These include flashes or spots.  Swelling (edema) in your face or hands. This also often happens near the end of normal pregnancies, even without preeclampsia.  Tests you may have  Your healthcare provider will want to check your blood pressure throughout your pregnancy. If your blood pressure is high, you may have the following tests:  Urine tests to look for protein  Blood tests to confirm preeclampsia  Fetal monitoring to make sure that your baby is healthy  Treating preeclampsia  You may need to take a daily low dose of aspirin if you are at risk for preeclampsia. Preeclampsia almost always ends soon after you give birth. Until then, your healthcare provider can help manage your condition. If your symptoms are mild, you may need activity limits at home, including bed rest and no heavy lifting. If your symptoms are severe, you will stay in the hospital. Hospital treatment includes:  Activity limits to help control blood pressure. This means no heavy lifting and 8 hours per day lying down with the feet up.  Magnesium IV (intravenous) drip during labor to prevent seizures  Induced labor or surgical delivery by  section. Delivery is considered the cure for preeclampsia.  When to call your healthcare provider  Call your healthcare provider if swelling, weight gain, or other symptoms come on quickly or are severe. Some cases of  preeclampsia are more severe than others. Your symptoms also may change or get worse as you get closer to your due date.  Who’s at risk?  No one knows what causes preeclampsia. Preeclampsia can happen in any pregnant woman. But it is more common in first-time pregnancies. Things that increase the risk include:  Previous pregnancies. You are at risk if you had preeclampsia, intrauterine growth retardation (IUGR),  birth, placental abruption, or fetal death in a past pregnancy.  Health history of mother. You are at risk if you have diabetes, high blood pressure, obesity, kidney disease, autoimmune disease such as lupus, or a family history of preeclampsia.  Current pregnancy. You are at risk if this is your first pregnancy, or if you have multiple fetuses, are younger than age 18 or older than 40, or used in vitro fertilization.  Race. You are at risk if you are black.  Dangers of preeclampsia  If not treated, preeclampsia can cause problems for you and your baby. The placenta is the organ that nourishes your baby. It may tear away from the uterine wall. This can put the baby at risk for health problems (fetal distress) and premature birth. Preeclampsia can also cause these health problems:  Kidney failure or other organ damage  Seizures  Stroke  Once you give birth  In most cases, preeclampsia goes away on its own soon after you give birth. This is often by the 12th week after you give deliver. Within days of delivery, your blood pressure, swelling, and other symptoms should get better. For some women, problems from preeclampsia can continue after delivery.  Postpartum preeclampsia that develops within the first 48 hours after delivery is rare. Another type of postpartum preeclampsia that develops more than 48 hours after delivery is called late-onset preeclampsia. It is also rare. Contact your healthcare provider right away if you have symptoms of preeclampsia after you deliver.  Roxy last reviewed this  educational content on 12/1/2019 © 2000-2020 [employer]. [address] [place] [address]. All rights reserved. This information is not intended as a substitute for professional medical care. Always follow your healthcare professional's instructions.        Kick Counts    It’s normal to worry about your baby’s health. One way you can know your baby’s doing well is to record the baby’s movements once a day. This is called a kick count. Remember to take your kick count records to all your appointments with your healthcare provider.  How to count kicks  Time how long it takes you to feel 10 kicks, flutters, swishes, or rolls. Ideally, you want to feel at least 10 movements within 2 hours. You will likely feel 10 movements in less time than that.  Starting at 28 weeks, count your baby's movements daily. Follow your healthcare provider's instructions for kick counting. Here are tips for counting kicks:  Choose a time when the baby is active, such as after a meal.   Sit comfortably or lie on your side.   The first time the baby moves, write down the time.   Count each movement until the baby has moved 10 times. This can take from 20 minutes to 2 hours.   If you have not felt 10 kicks by the end of the second hour, wait a few hours. Then try again.  Try to do it at the same time each day.  When to call your healthcare provider  Call your healthcare provider right away if:  You do a couple sets of kick counts during the day and your baby moves fewer than 10 times in 2 hours  Your baby moves much less often than on the days before.  You have not felt your baby move all day.  Carma last reviewed this educational content on 12/1/2017 © 2000-2020 [employer]. [address] [place] [address]. All rights reserved. This information is not intended as a substitute for professional medical care. Always follow your healthcare professional's instructions.        Recognizing  Labor    The beginning of labor is the beginning of birth. You’ll start to feel strong contractions. That’s when the muscles of your uterus tighten up to help push your baby out during birth.  Yes, labor has probably started   Signs of labor include:  Your contractions are getting stronger and more painful instead of weaker. You’ll probably feel them throughout your whole uterus.  Your contractions are regular. This means that you feel them about every 5 to 10 minutes. And they are getting closer together.  You have pink-colored or blood-streaked fluid from your vagina.  You feel that the baby has \"dropped\" lower in your pelvis   Your water breaks. It may be a gush or a slow trickle of clear fluid from your vagina.  No, it’s probably not real labor   Signs of false labor include:  Your contractions aren’t regular or strong.  You feel the contractions only in your lower uterus.  Your contractions go away when you walk or change position.  Your contractions go away after drinking fluids.  When to call your healthcare provider  Call your healthcare provider or clinic right away if you notice any of these signs:  Fluid from your vagina, with or without contractions.  Bleeding heavy enough that you need a sanitary pad.  You don’t feel your baby moving as much as before.     NOTE: Contractions are timed by both of these measures:  The length of each contraction from its start to its finish.  How far apart the contractions are--the time between the start of one contraction and the start of the next contraction.   [employer] last reviewed this educational content on 10/1/2017  © 5689-1668 [employer]. [address] [place] [address], [place] [address]. All rights reserved. This information is not intended as a substitute for professional medical care. Always follow your healthcare professional's instructions.        Stages of Labor    Labor has 3 stages. Your healthcare provider may talk about the progress of your labor  with certain words. One of these is your baby’s position. Another is your baby’s station. And the effacement and dilation of your cervix will be noted. Read below to learn about these terms and the 3 stages of labor.  Your baby moves into position  Position is your baby’s placement in your uterus. Your baby may be facing left or right. He or she may be head first or feet first. Station refers to how far your baby has moved down into your pelvic cavity.  First stage of labor  During the first stage of labor, contractions of the uterus help your cervix thin (efface). They also help it widen (dilate). This will help your baby pass through the birth canal (vagina). At first your contractions will not come that often or last that long. But as time passes, they will come more often, they may be more painful, and they will last longer. They will last about 30 to 60 seconds each. The first stage of labor lasts until the cervix is fully dilated.  Second stage of labor  When your cervix is fully dilated, the second stage of labor begins. In this stage, you will have stronger contractions of your uterus that will help your baby move down the birth canal. They may happen every 2 to 5 minutes. They may last from 45 to 90 seconds each. Your healthcare provider will ask you to push with each contraction. Try to rest between the contractions if you can. Your baby is delivered at the end of this stage of labor.  Third stage of labor  The third stage of labor comes after your baby is born. This is when the afterbirth (placenta) comes out of your uterus. Your uterus will continue to contract. But the contractions are much milder than before.  [employer] last reviewed this educational content on 10/1/2017  © 9102-9334 [employer]. [address] [place] [address], [place] [address]. All rights reserved. This information is not intended as a substitute for professional medical care. Always follow your healthcare professional's  instructions.

## 2024-01-18 NOTE — PROGRESS NOTES
Ruthie is a 23 year old , at 38w6d, here for her return OB visit.  Currently, she is feeling well. Denies contractions and bleeding. Feels the same leaking as she had last week. Reports decreased fetal movement since last visit. Baby is still moving but just not as much as before.     Vital signs and weight reviewed  See flowsheets  SVE /-3, soft, mid. Membrane sweep performed with informed consent.  Glucose log reviewed: no elevated fasting levels, 2 mildly elevated postprandials     Today's Assessment/Plan:   Glucose log reviewed from the past week  Natural methods for cervical ripening/ stimulating labor reviewed  Recognizing labor reviewed.  Risks and benefits of IOL discussed. Pt wants to be induced on her due date. IOL scheduled for  at 9pm.    Next visit: To OB triage for NST & JUSTA then in 1 week if normal    Reviewed:   3rd trimester precautions and expectations  Labor precautions  Kick counts  Danger signs  Prenatal visit schedule      Pt verbalized understanding. All questions answered. No barriers to learning identified

## 2024-01-19 NOTE — TELEPHONE ENCOUNTER
Name and  verified    Patient states she was 1.5 cm in triage yesterday. Since 9:30 this morning she has had contractions increasing in intensity and between 4-5 minutes apart. Reports brown discharge and loss of mucous plug. When asked about fetal movement she states she has noticed baby move 3 times since around 9:30 this morning.  Advised she needs to go to triage for assessment.     MB notified via secure chat and FBC Tasha notified.

## 2024-01-19 NOTE — PROGRESS NOTES
Pt is a 23 year old female admitted to TR1/TR1-A.     Chief Complaint   Patient presents with    R/o Rom    R/o Labor     Pt sent by the office with C/O LOF and decreased fetal movement. Pt states LOF has been for over a week.       Pt is  39w0d intra-uterine pregnancy.  History obtained, consents signed. Oriented to room, staff, and plan of care.

## 2024-01-20 NOTE — PROGRESS NOTES
Pt is a 23 year old female admitted to TR1/TR1-A.     Chief Complaint   Patient presents with    R/o Labor     Feeling stronger contractions for the last few hours      Pt is  39w1d intra-uterine pregnancy.  History obtained, consents signed. Oriented to room, staff, and plan of care.

## 2024-01-20 NOTE — PLAN OF CARE
Problem: BIRTH - VAGINAL/ SECTION  Goal: Fetal and maternal status remain reassuring during the birth process  Description: INTERVENTIONS:  - Monitor vital signs  - Monitor fetal heart rate  - Monitor uterine activity  - Monitor labor progression (vaginal delivery)  - DVT prophylaxis (C/S delivery)  - Surgical antibiotic prophylaxis (C/S delivery)  2024 by Tasha Ley RN  Outcome: Progressing  2024 by Tasha Ley RN  Outcome: Progressing     Problem: PAIN - ADULT  Goal: Verbalizes/displays adequate comfort level or patient's stated pain goal  Description: INTERVENTIONS:  - Encourage pt to monitor pain and request assistance  - Assess pain using appropriate pain scale  - Administer analgesics based on type and severity of pain and evaluate response  - Implement non-pharmacological measures as appropriate and evaluate response  - Consider cultural and social influences on pain and pain management  - Manage/alleviate anxiety  - Utilize distraction and/or relaxation techniques  - Monitor for opioid side effects  - Notify MD/LIP if interventions unsuccessful or patient reports new pain  - Anticipate increased pain with activity and pre-medicate as appropriate  2024 by Tasha Ley RN  Outcome: Progressing  2024 by Tasha Ley RN  Outcome: Progressing     Problem: ANXIETY  Goal: Will report anxiety at manageable levels  Description: INTERVENTIONS:  - Administer medication as ordered  - Teach and rehearse alternative coping skills  - Provide emotional support with 1:1 interaction with staff  2024 by Tasha Ley RN  Outcome: Progressing  2024 by Tasha Ley RN  Outcome: Progressing     Problem: Patient Centered Care  Goal: Patient preferences are identified and integrated in the patient's plan of care  Description: Interventions:  - What would you like us to know as we care for you? Having a boy names Kumar.  - Provide timely, complete,  and accurate information to patient/family  - Incorporate patient and family knowledge, values, beliefs, and cultural backgrounds into the planning and delivery of care  - Encourage patient/family to participate in care and decision-making at the level they choose  - Honor patient and family perspectives and choices  1/20/2024 0139 by Tasha Ley, RN  Outcome: Progressing  1/20/2024 0139 by Tasha Ley, RN  Outcome: Progressing     Problem: Patient/Family Goals  Goal: Patient/Family Long Term Goal  Description: Patient's Long Term Goal: Healthy baby and healthy mother via vaginal delivery.      Interventions:  - See additional Care Plan goals for specific interventions  Outcome: Progressing  Goal: Patient/Family Short Term Goal  Description: Patient's Short Term Goal: Pain management    Interventions:   - PRN pain medications but wanting to try to not get an epidural  - See additional Care Plan goals for specific interventions  Outcome: Progressing

## 2024-01-20 NOTE — ANESTHESIA PROCEDURE NOTES
Labor Analgesia    Date/Time: 1/20/2024 4:56 AM    Performed by: Jane Valdes MD  Authorized by: Jane Valdes MD      General Information and Staff    Start Time:  1/20/2024 4:56 AM  End Time:  1/20/2024 5:11 AM  Anesthesiologist:  Jane Valdes MD  Performed by:  Anesthesiologist  Patient Location:  OB  Site Identification: surface landmarks    Reason for Block: labor epidural    Preanesthetic Checklist: patient identified, IV checked, site marked, risks and benefits discussed, monitors and equipment checked, pre-op evaluation, timeout performed, IV bolus, anesthesia consent and sterile technique used      Procedure Details    Patient Position:  Sitting  Prep: ChloraPrep and patient draped    Monitoring:  Heart rate, cardiac monitor and continuous pulse ox  Approach:  Midline    Epidural Needle    Injection Technique:  OFE air  Needle Type:  Tuohy  Needle Gauge:  18 G  Needle Length:  3.5 in  Needle Insertion Depth:  4  Location:  L2-3    Spinal Needle      Catheter    Catheter Type:  Multi-orifice  Catheter Size:  20 G  Catheter at Skin Depth:  10  Test Dose:  Negative    Assessment    Sensory Level:  T8    Additional Comments

## 2024-01-20 NOTE — PROGRESS NOTES
Patient up to bathroom with assist x 2.  Unable to void at this time. Patient transferred to mother/baby room 357 per wheelchair in stable condition with baby and personal belongings.  Accompanied by significant other and staff.  Report given to mother/baby RN.

## 2024-01-20 NOTE — PLAN OF CARE
Problem: POSTPARTUM  Goal: Appropriate maternal -  bonding  Description: INTERVENTIONS:  - Assess caregiver- interactions.  - Assess caregiver's emotional status and coping mechanisms.  - Encourage caregiver to participate in  daily care.  - Assess support systems available to mother/family.  - Provide /case management support as needed.  Outcome: Progressing

## 2024-01-20 NOTE — ANESTHESIA POSTPROCEDURE EVALUATION
Patient: Ruthie Morrow    Procedure Summary       Date: 01/20/24 Room / Location:     Anesthesia Start: 0456 Anesthesia Stop: 0908    Procedure: LABOR ANALGESIA Diagnosis:     Scheduled Providers:  Anesthesiologist: Raheem Cheatham MD    Anesthesia Type: epidural ASA Status: 2            Anesthesia Type: epidural    Vitals Value Taken Time   /59 01/20/24 1001   Temp 99.4 °F (37.4 °C) 01/20/24 0930   Pulse 111 01/20/24 1005   Resp 16 01/20/24 1231   SpO2 99 % 01/20/24 1005   Vitals shown include unfiled device data.    EM AN Post Evaluation:   Patient Evaluated in floor  Patient Participation: complete - patient participated  Level of Consciousness: awake and alert  Pain Score: 0  Pain Management: adequate  Airway Patency:patent  Yes    Cardiovascular Status: acceptable and stable  Respiratory Status: acceptable  Postoperative Hydration acceptable      Raheem Cheatham MD  1/20/2024 12:31 PM

## 2024-01-20 NOTE — ANESTHESIA PREPROCEDURE EVALUATION
Anesthesia PreOp Note    HPI:     Ruthie Morrow is a 23 year old female who presents for preoperative consultation requested by: * No surgeons listed *    Date of Surgery: 1/20/2024    * No procedures listed *  Indication: * No pre-op diagnosis entered *    Relevant Problems   No relevant active problems       NPO:                         History Review:  Patient Active Problem List    Diagnosis Date Noted    Normal labor and delivery 01/20/2024    Diet controlled gestational diabetes mellitus (GDM) in third trimester 11/16/2023    Large for dates 09/29/2023    Obesity in pregnancy 07/31/2023    Pregnancy 07/05/2023       Past Medical History:   Diagnosis Date    Anemia     Gestational diabetes     Iron deficiency anemia due to chronic blood loss 12/07/2019    Irregular menses 12/07/2019    With excessive bleeding    Yeast vaginitis 09/10/2020       History reviewed. No pertinent surgical history.    Medications Prior to Admission   Medication Sig Dispense Refill Last Dose    prenatal vitamin with DHA 27-0.8-228 MG Oral Cap Take 1 capsule by mouth daily.   1/19/2024    aspirin (ASPIRIN LOW DOSE) 81 MG Oral Chew Tab Chew 1.5 tablets (121.5 mg total) by mouth daily. 60 tablet 5 1/19/2024    Blood Glucose Monitoring Suppl (ONETOUCH VERIO FLEX SYSTEM) w/Device Does not apply Kit 1 kit 4 (four) times daily. 1 kit 0     Glucose Blood (ONETOUCH VERIO) In Vitro Strip 1 strip 4 (four) times daily. Test blood glucose 4 times daily as directed 100 strip 1     Lancets (ONETOUCH DELICA PLUS [id number]) Does not apply Misc 4 each 4 (four) times daily. Test morning and 2 hours after each meal. 100 each 3      Current Facility-Administered Medications Ordered in Epic   Medication Dose Route Frequency Provider Last Rate Last Admin    acetaminophen (Tylenol Extra Strength) tab 500 mg  500 mg Oral Q6H PRN Candi Taveras CNM        acetaminophen (Tylenol Extra Strength) tab 1,000 mg  1,000 mg Oral Q6H PRN Candi Taveras  AMA        ibuprofen (Motrin) tab 600 mg  600 mg Oral Once PRN Candi Taveras CNM        ondansetron (Zofran) 4 MG/2ML injection 4 mg  4 mg Intravenous Q6H PRN Candi Taveras CNM        oxyTOCIN in sodium chloride 0.9% (Pitocin) 30 Units/500mL infusion premix  62.5-900 surya-units/min Intravenous Continuous Candi Taveras CNM   Held at 01/20/24 0045    terbutaline (Brethine) 1 MG/ML injection 0.25 mg  0.25 mg Subcutaneous PRN Candi Taveras CNM        sodium citrate-citric acid (Bicitra) 500-334 MG/5ML oral solution 30 mL  30 mL Oral PRN Candi Taveras CNM        lidocaine PF (Xylocaine-MPF) 1% injection  30 mL Intradermal Once Candi Taveras CNM        lactated ringers infusion   Intravenous Continuous Candi Taveras  mL/hr at 01/20/24 0300 New Bag at 01/20/24 0300    dextrose in lactated ringers 5% infusion   Intravenous PRN Candi Taveras CNM        lactated ringers IV bolus 500 mL  500 mL Intravenous PRN Candi Taveras CNM        nalbuphine (Nubain) 10 mg/mL injection 10 mg  10 mg Intramuscular Q4H PRN Candi Taveras CNM   10 mg at 01/20/24 0150    And    hydrOXYzine 50 mg/mL injection 50 mg  50 mg Intramuscular Q4H PRN Candi Taveras CNM   50 mg at 01/20/24 0149    fentaNYL (Sublimaze) 50 mcg/mL injection 100 mcg  100 mcg Intravenous Once Candi Taveras CNM        fentaNYL (Sublimaze) 50 mcg/mL injection 50 mcg  50 mcg Intravenous Q30 Min PRN Candi Taveras CNM        calcium carbonate (Tums) chewable tab 1,000 mg  1,000 mg Oral Q4H PRN Candi Taveras CNM        oxyTOCIN in sodium chloride 0.9% (Pitocin) 30 Units/500mL infusion premix  0.5-20 surya-units/min Intravenous Continuous Candi Taveras CNM 4 mL/hr at 01/20/24 0330 4 surya-units/min at 01/20/24 0330    fentaNYL-bupivacaine 2 mcg/mL-0.125% in sodium chloride 0.9% 100 mL EPIDURAL infusion premix   Epidural Continuous Jane Valdes MD        fentaNYL  (Sublimaze) 50 mcg/mL injection 100 mcg  100 mcg Epidural Once Jane Valdes MD        bupivacaine PF (Marcaine) 0.25% injection  20 mL Epidural Once Jane Valdes MD        EPHEDrine (PF) 25 MG/5 ML injection 5 mg  5 mg Intravenous PRN Jane Valdes MD        nalbuphine (Nubain) 10 mg/mL injection 2.5 mg  2.5 mg Intravenous Q15 Min PRN Jane Valdes MD         No current [facility]-ordered outpatient medications on file.       No Known Allergies    Family History   Problem Relation Age of Onset    Lipids Father      Social History     Socioeconomic History    Marital status: Single   Tobacco Use    Smoking status: Never     Passive exposure: Current (monthly)    Smokeless tobacco: Never   Substance and Sexual Activity    Alcohol use: Not Currently     Comment: occ    Drug use: Never     Comment: once every 5 months       Available pre-op labs reviewed.  Lab Results   Component Value Date    WBC 13.0 (H) 01/20/2024    RBC 5.14 01/20/2024    HGB 12.3 01/20/2024    HCT 38.8 01/20/2024    MCV 75.5 (L) 01/20/2024    MCH 23.9 (L) 01/20/2024    MCHC 31.7 01/20/2024    RDW 15.9 (H) 01/20/2024    .0 01/20/2024     Lab Results   Component Value Date    PGLU 103 (H) 01/20/2024          Vital Signs:  Body mass index is 37.57 kg/m².   height is 1.499 m (4' 11\") and weight is 84.4 kg (186 lb). Her oral temperature is 97.7 °F (36.5 °C). Her blood pressure is 122/76 and her pulse is 92. Her respiration is 20.   Vitals:    01/20/24 0015 01/20/24 0109 01/20/24 0130 01/20/24 0300   BP: 107/71  123/83 122/76   Pulse: 102  90 92   Resp: 18  20    Temp: 98.6 °F (37 °C)  97.7 °F (36.5 °C)    TempSrc: Oral  Oral    Weight:  84.4 kg (186 lb)     Height:            Anesthesia Evaluation     Patient summary reviewed and Nursing notes reviewed    Airway   Mallampati: II  TM distance: >3 FB  Neck ROM: full  Dental      Pulmonary - negative ROS and normal exam   Cardiovascular - normal exam  Exercise tolerance: good    NYHA Classification: I     Neuro/Psych - negative ROS     GI/Hepatic/Renal - negative ROS     Endo/Other    (+) diabetes mellitus type 2    Comments:   Abdominal  - normal exam                 Anesthesia Plan:   ASA:  2  Plan:   Epidural  Informed Consent Plan and Risks Discussed With:  Patient  Discussed plan with:  Attending and surgeon      I have informed Ruthiestalin Morrow and/or legal guardian or family member of the nature of the anesthetic plan, benefits, risks including possible dental damage if relevant, major complications, and any alternative forms of anesthetic management.   All of the patient's questions were answered to the best of my ability. The patient desires the anesthetic management as planned.  I have informed this Ruthie Morrow  of the risks of neuraxial anesthesia including, but not limited to: failure,headache, backache, spinal, unilateral/patchy block, difficulty breathing, infection, bleeding, nerve damage, paralysis, death. The patient desires the proposed neuraxial anesthetic as planned.    GILMAR RIGGS MD  1/20/2024 4:55 AM  Present on Admission:   Obesity in pregnancy   Diet controlled gestational diabetes mellitus (GDM) in third trimester

## 2024-01-20 NOTE — H&P
[facility]    History & Physical    Ruthie Morrow Patient Status:  Inpatient    3/17/2000 MRN [id number]   Location [facility] Attending Candi Taveras CNM   Hosp Day # 0 Admitting Candi Feliciano MD     Date of Admission:  2024      HPI:   Ruthie Morrow is a 23 year old female  current EGA of 39w1d with an estimated date of delivery of: 2024, by Ultrasound      Ruthie Morrow is being admitted for labor management.    Her current obstetrical history is significant for gestational DM, obesity.    Patient reports contractions since 0930 am  .     Fetal Movement: normal.     History   Obstetric History:   OB History    Para Term  AB Living   1 0 0 0 0 0   SAB IAB Ectopic Multiple Live Births   0 0 0 0 0      # Outcome Date GA Lbr Binh/2nd Weight Sex Delivery Anes PTL Lv   1 Current              Past Medical History:   Past Medical History:   Diagnosis Date    Anemia     Gestational diabetes     Iron deficiency anemia due to chronic blood loss 2019    Irregular menses 2019    Yeast vaginitis 09/10/2020     Past Social History: History reviewed. No pertinent surgical history.  Family History:   Family History   Problem Relation Age of Onset    Diabetes Father      Social History:   Social History     Tobacco Use    Smoking status: Never    Smokeless tobacco: Never   Substance Use Topics    Alcohol use: Not Currently     Comment: occ        Allergies/Medications:   Allergies:   No Known Allergies  Medications:  Medications Prior to Admission   Medication Sig Dispense Refill Last Dose    prenatal vitamin with DHA 27-0.8-228 MG Oral Cap Take 1 capsule by mouth daily.   2024    aspirin (ASPIRIN LOW DOSE) 81 MG Oral Chew Tab Chew 1.5 tablets (121.5 mg total) by mouth daily. 60 tablet 5 2024    Blood Glucose Monitoring Suppl (ONETOUCH VERIO FLEX SYSTEM) w/Device Does not apply Kit 1 kit 4 (four) times daily.  1 kit 0     Glucose Blood (ONETOUCH VERIO) In Vitro Strip 1 strip 4 (four) times daily. Test blood glucose 4 times daily as directed 100 strip 1     Lancets (ONETOUCH DELICA PLUS [id number]) Does not apply Misc 4 each 4 (four) times daily. Test morning and 2 hours after each meal. 100 each 3        Review of Systems:   Constitutional: denies fever, aches, chills  HEENT: denies visual changes  Skin: denies any unusual skin lesions or itching  Lungs: denies shortness of breath  Cardiovascular: denies chest pain  GI: denies abdominal pain,denies heartburn, denies constipation or diarrhea  : denies dysuria, pelvic pain, vaginal discharge or bleeding  Musculoskeletal: denies back or joint pain  Neuro denies headaches or dizziness  Psych: denies depression or anxiety    Physical Exam:   Pulse:  [97] 97  BP: (132)/(74) 132/74    Constitutional: alert, appears stated age, and cooperative uncomfortable  Psych:  Appropriate affect and speech  Skin: no lesions or erythema  Respiratory: clear, unlabored  Cardiac: RRR  Breast: normal appearance, nipples everted  Abdomen: soft, non-tender, EFW 7 1/2#, uterine contractions q 4-5 minutes  Fetal Surveillance:  140 BPM; Fetal heart variability: moderate  Fetal Heart Rate decelerations: none  Fetal Heart Rate accelerations: yes  Sterile vaginal exam:  4/80/-2 per RN  External Genitalia:  No lesions  Extremities: extremities normal, atraumatic, no cyanosis or edema  Musculoskeletal: steady gait      Results:     Prenatal Results       Initial       Test Value Reference Range Date Time    Blood Type (ABO Group)  B   07/01/23 1310    Rh Factor  Positive   07/01/23 1310    Antibody Screen (Required questions in OE to answer)  Negative   07/01/23 1310    HCT  39.1 % 35.0 - 48.0 07/01/23 1310       37.0 % 35.0 - 48.0 06/20/23 2149    HGB  11.9 g/dL 12.0 - 16.0 07/01/23 1310       11.8 g/dL 12.0 - 16.0 06/20/23 2149    MCV  77.0 fL 80.0 - 100.0 07/01/23 1310       73.4 fL 80.0 - 100.0  06/20/23 2149    Platelets  335.0 10(3)uL 150.0 - 450.0 07/01/23 1310       351.0 10(3)uL 150.0 - 450.0 06/20/23 2149    Rubella  Positive  Positive 07/01/23 1310    TREP  Nonreactive  Nonreactive  07/01/23 1310    RPR ([facility])        Urine Culture  <10,000 cfu/ml Mixture of Gram positive organisms isolated - probable contamination.   07/01/23 1310    Hepatitis B  Nonreactive  Nonreactive  07/01/23 1310    HIV Combo  Non-Reactive  Non-Reactive 10/27/23 1016       Non-Reactive  Non-Reactive 07/01/23 1310    HCV  Nonreactive  Nonreactive  07/01/23 1310              Optional Initial Labs       Test Value Reference Range Date Time    TSH        Pap Smear  Negative for intraepithelial lesion or malignancy  Negative for intraepithelial lesion or malignancy 07/03/23 1314    HPV        GC DNA        Chlamydia DNA        GTT 1 Hr        Glucose Fasting        Glucose 1 Hr        Glucose 2 Hr        Glucose 3 Hr        HgB A1c        Vitamin D                  8-20 Weeks       Test Value Reference Range Date Time    1st Trimester Aneuploidy Risk Assessment        Quad - Down Screen Risk Estimate - prior to 02/20/18        Quad - Down Maternal Age Risk - prior to 02/20/18        Quad - Trisomy 18 screen Risk Estimate - prior to 02/20/18        AFP Spina Bifida (Required questions in OE to answer )        QUAD/AFP - Interpretation        Free Fetal DNA  ^ Low risk for trisomy 21, 18, 13, monosomy X, triploidy, and 22q11.2 deletion syndrome   08/16/23     Genetic testing        Genetic testing        Genetic testing        GC DNA  Negative  Negative 07/03/23 1314    Chlamydia DNA  Negative  Negative 07/03/23 1314              24-28 Weeks       Test Value Reference Range Date Time    HCT  37.4 % 35.0 - 48.0 10/27/23 1016    HGB  12.2 g/dL 12.0 - 16.0 10/27/23 1016    Platelets  275.0 10(3)uL 150.0 - 450.0 10/27/23 1016    GTT 1 Hr  130 mg/dL See comment 10/27/23 1016    Glucose Fasting  83 mg/dL See comment 11/13/23 0755     Glucose 1 Hr  187 mg/dL See comment 23 0903    Glucose 2 Hr  157 mg/dL See comment 23 1005    Glucose 3 Hr  129 mg/dL 70 - <140 23 1103    TSH         Profile        Urine Culture        GC DNA  Negative  Negative 23 1531    Chlamydia DNA  Negative  Negative 23 1531              35-37 Weeks       Test Value Reference Range Date Time    HCT        HGB        Platelets        TREP  Negative  Negative 10/27/23 1016    RPR ([facility])        Genital Group B Culture  Negative  Negative 23 1337    TSH        Urine Culture        HIV Combo        GC DNA  Negative  Negative 23 1337    Chlamydia DNA  Negative  Negative 23 1337              Optional Labs       Test Value Reference Range Date Time    HgB A1c  5.2 % <5.7 23 0758    HGB Electrophoresis        Varicella Zoster        Cystic Fibrosis-Old         Cystic Fibrosis[32] (Required questions in OE to answer)        Cystic Fibrosis[165] (Required questions in OE to answer)        Cystic Fibrosis[165] (Required questions in OE to answer)        Cystic Fibrosis[165] (Required questions in OE to answer)        Sickle Cell        24Hr Urine Protein        24Hr Urine Creatinine        Parvo B19 IgM        Parvo B19 IgG                  Legend    ^: Historical                            Reviewed all prenatal ultrasounds    Admit labs pending      Assessment/Plan:    Ruthie Morrow is at an estimated gestational age of 39w1d with an estimated date of delivery of:  2024, by Ultrasound    Early latent labor.  Obstetrical history significant for gestational DM, obesity.    Admission problem(s):    Normal labor and delivery  - Admit, routine labs  -Nubain/vistaril per pt request for IV pain meds  -Category 1 tracing    GDMA1  -Accu check on admit    Obesity in pregnancy  -normal growth u/s          Risks, benefits, alternatives and possible complications have been discussed in detail with the patient.    Pre-admission, admission, and post admission procedures and expectations were discussed in detail.    All questions answered, all appropriate consents will be signed at the Hospital. Admission is planned for today.   Analgesia: IM/IV narcotic.    Candi Taveras CNM  1/20/2024  12:38 AM

## 2024-01-20 NOTE — LACTATION NOTE
LACTATION NOTE - MOTHER      Evaluation Type: Inpatient    Problems identified  Problems identified: Knowledge deficit;Unable to acheive sustained latch;Milk supply WNL    Maternal history  Maternal history: Gestational diabetes;Obesity         Maternal Assessment  Bilateral Breasts: Wide spaced;Elastic  Bilateral Nipples: Slightly everted/short;Colostrum easily expressed  Prior breastfeeding experience (comment below): Primip  Breastfeeding Assistance: Breastfeeding assistance provided with permission;Hand expression provided with permission    Pain assessment  Treatment of Sore Nipples: Deeper latch techniques    Guidelines for use of:  Other (comment): Called into room by RN due to latch difficulty. Mom having trouble positioning LGA infant. Instructed on football and cross-cradle hold, mom more comfortable in the latter and position reinforced with several pillows. Infant initially disorganized and popping off frequently, with encouragement and after several attempts able to sustain latch. Mom able to express large drops of colostrum, encouraged to offer drops of BM as needed when infant sleepy, fussy or disorganized.

## 2024-01-20 NOTE — PROGRESS NOTES
[facility]    Labor Progress Note    Ruthie Morrow Patient Status:  Inpatient    3/17/2000 MRN [id number]   Location [facility] Attending Candi Taveras CNM   Hosp Day # 0 PCP NORMAN BURNETT MD     Subjective:   Interval History:   Resting comfortably with epidural.     Objective:   Vital signs in last 24 hours:  Temp:  [97.7 °F (36.5 °C)-98.6 °F (37 °C)] 97.7 °F (36.5 °C)  Pulse:  [] 75  Resp:  [18-20] 20  BP: (107-134)/() 111/69  SpO2:  [97 %-98 %] 97 %    Input/Output:    Intake/Output Summary (Last 24 hours) at 2024 0625  Last data filed at 2024 0200  Gross per 24 hour   Intake --   Output 50 ml   Net -50 ml       Fetal Surveillance:  Fetal heart tones:  140, moderate variability, no accels, no decels  Uterine contractions:  2-4 minutes apart    Sterile vaginal exam:  Dilation:  7.5    Effacement:  90    Station: -1   Position: Cephalic  Presentation: vertex  AROM for clear fluid    Pitocin @ 2 mu/min    Results:   Lab Results   Component Value Date    TREPONEMALAB Negative 10/27/2023    ABO B 2024    RH Positive 2024    WBC 13.0 (H) 2024    HGB 12.3 2024    HCT 38.8 2024    .0 2024    CREATSERUM 0.49 (L) 2023    BUN 8 2023     2023    K 4.1 2023     2023    CO2 23.0 2023    GLU 99 2023    CA 9.2 2023    ALB 3.3 (L) 2023    ALKPHO 91 2023    BILT 0.2 2023    TP 6.9 2023    AST 16 2023    ALT 23 2023    PTT 27.0 2019    INR 1.09 2019    TSH 1.12 2018       Lab Results   Component Value Date    COLORUR Yellow 2023    CLARITY Clear 2023    SPECGRAVITY 1.018 2023    PROUR Negative 2023    GLUUR Normal 2023    KETUR Negative 2023    BILUR Negative 2023    BLOODURINE 2+ (A) 2023    NITRITE Negative 2023    UROBILINOGEN Normal 2023     LEUUR Negative 2023    UASA Negative 2019       Assessment & Plan:     Obesity in pregnancy  -Normal growth u/s      Diet controlled gestational diabetes mellitus (GDM) in third trimester  -Accu check on admission 103      Normal labor and delivery  -Category 1 tracing  -Epidural infusing  -Continue with present mgmt  -Anticipate           Plan discussed with patient who verbalizes understanding and agreement.    Caring for pt until 0700    Candi Taveras CNM  2024

## 2024-01-20 NOTE — L&D DELIVERY NOTE
Jerson Beckham [[id number]]      Labor Events     labor?: No   steroids?: None  Antibiotics received during labor?: No  Rupture date/time: 2024 0619     Rupture type: AROM  Fluid color: Clear  Labor type: Spontaneous Onset of Labor  Augmentation: Oxytocin, AROM  Indications for augmentation: Ineffective Contraction Pattern  Intrapartum & labor complications: None       Labor Event Times    Labor onset date/time: 2024 2100  Dilation complete date/time: 202434  Start pushing date/time: 2024 0835       [place] Presentation    Presentation: Vertex  Position: Right Occiput Anterior       Operative Delivery    Operative Vaginal Delivery: No                      Shoulder Dystocia    Shoulder Dystocia: No             Anesthesia    Method: Epidural   Analgesics:  Analgesics   NALBUPHINE HCL 10 MG/ML IJ SOLN   HYDROXYZINE HCL 50 MG/ML IM SOLN              Delivery      Head delivery date/time: 2024 09:02:12   Delivery date/time:  24 09:03:19   Delivery type: Normal spontaneous vaginal delivery    Details:     Delivery location: delivery room  Delivery Room Temperature: 74       Delivery Providers       Delivery personnel:  Provider Role    Baby Nurse    Delivery Nurse             Cord    No data filed        Measurements      Weight: 3544 g 7 lb 13 oz Length: 52 cm     Head circum.: 32.5 cm                      Placenta    Date/time: 2024 0908  Removal: Spontaneous       Apgars    Living status: Living   Apgar Scoring Key:    0 1 2    Skin color Blue or pale Acrocyanotic Completely pink    Heart rate Absent <100 bpm >100 bpm    Reflex irritability No response Grimace Cry or active withdrawal    Muscle tone Limp Some flexion Active motion    Respiratory effort Absent Weak cry; hypoventilation Good, crying              1 Minute:  5 Minute:  10 Minute:  15 Minute:  20 Minute:      Skin color: 1  1       Heart rate: 2  2       Reflex irritablity: 2  2        Muscle tone: 1  2       Respiratory effort: 2  2       Total: 8  9          Apgars assigned by: GABRIELA RUBI  [place] disposition: with mother       Skin to Skin    Skin to skin initiated date/time: 2024 0903  Skin to skin with: Mother       Vaginal Count    Initial count RN: Tasha Ley RN  Initial count Tech: Betty Wagner   Sharps    Initial counts 10   0    Final counts               Delivery (Maternal)    Episiotomy: None  Perineal lacerations: 1st Repaired?: Yes     Vaginal laceration?: No      Cervical laceration?: No    Clitoral laceration?: No    Estimated blood loss (mL): 365              Ruthie progressed to complete dilatation and +2 station prior to pushing successfully to viable baby boy. See Delivery Summary above for time, APGARs, and weight. Fetal head presented in the OA position and rotated to ANITA. Sweep for nuchal cord was performed and nuchal cord identified and reduced. Anterior shoulder not immediately delivered. Patient placed in [place] and shoulder spontaneously delivered without traction. Baby vigorous at birth. To maternal abdomen for dry and stimulation then cord cut after pulsing ceased. Prophylactic IV pitocin initiated in 3rd stage. Spontaneous placenta by sanchez mechanism, complete with 3 vessel cord. Hemostasis achieved by fundal massage and prophylactic IV Pitocin. Vagina and perineum inspected and 1st degree perineal laceration repaired with 3-0 vicryl. Mother and infant appeared in stable condition when midwife left the delivery room. Sharps and 4x4 counts were correct. Skin to skin initiated.    Quantitative Blood Loss (mL)  365

## 2024-01-20 NOTE — TRIAGE
[facility]      Triage Note    Ruthie Morrow Patient Status:  Outpatient    3/17/2000 MRN [id number]   Location [facility] BIRTH CENTER Attending Zoe Cifuentes CNM   Hosp Day # 0 PCP NORMAN BURNETT MD      Para:   Estimated Date of Delivery: 24  Gestation: 39w0d    Chief Complaint    R/o Rom; R/o Labor         Allergies:  No Known Allergies    No orders of the defined types were placed in this encounter.      Lab Results   Component Value Date    WBC 11.0 10/27/2023    HGB 12.2 10/27/2023    HCT 37.4 10/27/2023    .0 10/27/2023    CREATSERUM 0.49 (L) 2023    BUN 8 2023     2023    K 4.1 2023     2023    CO2 23.0 2023    GLU 99 2023    CA 9.2 2023    ALB 3.3 (L) 2023    ALKPHO 91 2023    BILT 0.2 2023    TP 6.9 2023    AST 16 2023    ALT 23 2023    PTT 27.0 2019    INR 1.09 2019    TSH 1.12 2018       Clinitek UA  Lab Results   Component Value Date    URCOLOR Yellow 2022    URCLA Clear 2022    GLUUR Normal 2023    URINEBILI Negative 2022    URINEKETONE Negative 2022    SPECGRAVITY 1.018 2023    PHUR 8.5 (A) 2022    PROTURINE Negative 2022    UROBILI 4.0 (A) 2022    URINENITRITE Negative 2022    URINELEUK Trace (A) 2022    URINECUL  2023     <10,000 cfu/ml Mixture of Gram positive organisms isolated - probable contamination.       UA  Lab Results   Component Value Date    COLORUR Yellow 2023    CLARITY Clear 2023    SPECGRAVITY 1.018 2023    PROUR Negative 2023    GLUUR Normal 2023    KETUR Negative 2023    BILUR Negative 2023    BLOODURINE 2+ (A) 2023    NITRITE Negative 2023    UROBILINOGEN Normal 2023    LEUUR Negative 2023    UASA Negative 2019       Vitals:    24 1515 24 1517   BP:  132/74    Pulse: 97    Weight:  84.4 kg (186 lb)       NST  Variability: Moderate           Accelerations: Yes           Decelerations: None            Baseline: 145 BPM           Uterine Irritability: No           Contractions: Irregular                                        Acoustic Stimulator: No           Nonstress Test Interpretation: Reactive           Nonstress Test Second Interpretation: Reactive                        Additional Comments   Patient ambulated in halls for 2 hours and made no cervical change. Patient was reexamined by Nitin SERRATO. Discharge orders given and labor precautions reviewed with patient. Patient discharged home in stable condition.    Chief Complaint   Patient presents with    R/o Rom    R/o Labor     Pt sent by the office with C/O LOF and decreased fetal movement. Pt states LOF has been for over a week.          Marielle NG RN  1/19/2024 7:50 PM

## 2024-01-20 NOTE — LACTATION NOTE
This note was copied from a baby's chart.  LACTATION NOTE - INFANT    Evaluation Type  Evaluation Type: Inpatient    Problems & Assessment  Problems Diagnosed or Identified: Latch difficulty  Problems: comment/detail: Seen at 5 hours old  Infant Assessment: Hunger cues present  Muscle tone: Appropriate for GA    Feeding Assessment  Summary Current Feeding: Breastfeeding exclusively  Breastfeeding Assessment: Assisted with breastfeeding w/mother's permission;Sustained nutrititive latch w/audible swallows;Coordinated suck/swallow;Tolerated feeding well  Breastfeeding Positions: left breast;football;cross cradle  Latch: Repeated attempts, hold nipple in mouth, stimulate to suck  Audible Sucks/Swallows: A few with stimulation  Type of Nipple: Everted (after stimulation)  Comfort (Breast/Nipple): Soft/non-tender  Hold (Positioning): Full assist, teach one side, mother does other, staff holds  LATCH Score: 7                        Detail Level: Simple

## 2024-01-20 NOTE — PROGRESS NOTES
Pt is a 23 year old female admitted to [facility] [place].     Chief Complaint   Patient presents with    R/o Labor     Feeling stronger contractions for the last few hours      Pt is  39w1d intra-uterine pregnancy.  History obtained, consents signed. Oriented to room, staff, and plan of care.

## 2024-01-21 NOTE — PROGRESS NOTES
Lo Dumont notified of patient unable to void, orders to encourage water, may given patient 2 hours before straight cath.

## 2024-01-21 NOTE — LACTATION NOTE
LACTATION NOTE - MOTHER      Evaluation Type: Inpatient    Problems identified  Problems identified: Knowledge deficit;Milk supply WNL    Maternal history  Maternal history: Gestational diabetes;Obesity    Breastfeeding goal  Breastfeeding goal: To maintain breast milk feeding per patient goal    Maternal Assessment  Bilateral Breasts: Wide spaced;Elastic  Bilateral Nipples: Slightly everted/short;Colostrum easily expressed  Prior breastfeeding experience (comment below): Primip  Breastfeeding Assistance: Pumping assistance provided with permission;Breastfeeding assistance provided with permission    Pain assessment  Location/Comment: denies  Treatment of Sore Nipples: Deeper latch techniques    Guidelines for use of:  Breast pump type: Other (Zomee pump brought from home)  Current use of pump:: twice  Suggested use of pump: Pump each time a supplement is offered;Pump if infant is not latching to breast  Reported pumping volumes (ml): 22  Other (comment): Feeding plan includes breast and bottle, using own Zomee breast pump and offering EBM in addition to formula. Reviewed BM storage guidelines and guidelines for supplementing, encouraged to supplement with own BM as needed. Discussed Day 2 cluster-feeding behavior, I/O, signs of adequate intake and lactation physiology.

## 2024-01-21 NOTE — PLAN OF CARE
Problem: BIRTH - VAGINAL/ SECTION  Goal: Fetal and maternal status remain reassuring during the birth process  Description: INTERVENTIONS:  - Monitor vital signs  - Monitor fetal heart rate  - Monitor uterine activity  - Monitor labor progression (vaginal delivery)  - DVT prophylaxis (C/S delivery)  - Surgical antibiotic prophylaxis (C/S delivery)  Outcome: Completed     Problem: PAIN - ADULT  Goal: Verbalizes/displays adequate comfort level or patient's stated pain goal  Description: INTERVENTIONS:  - Encourage pt to monitor pain and request assistance  - Assess pain using appropriate pain scale  - Administer analgesics based on type and severity of pain and evaluate response  - Implement non-pharmacological measures as appropriate and evaluate response  - Consider cultural and social influences on pain and pain management  - Manage/alleviate anxiety  - Utilize distraction and/or relaxation techniques  - Monitor for opioid side effects  - Notify MD/LIP if interventions unsuccessful or patient reports new pain  - Anticipate increased pain with activity and pre-medicate as appropriate  Outcome: Progressing     Problem: ANXIETY  Goal: Will report anxiety at manageable levels  Description: INTERVENTIONS:  - Administer medication as ordered  - Teach and rehearse alternative coping skills  - Provide emotional support with 1:1 interaction with staff  Outcome: Progressing     Problem: Patient Centered Care  Goal: Patient preferences are identified and integrated in the patient's plan of care  Description: Interventions:  - What would you like us to know as we care for you?   - Provide timely, complete, and accurate information to patient/family  - Incorporate patient and family knowledge, values, beliefs, and cultural backgrounds into the planning and delivery of care  - Encourage patient/family to participate in care and decision-making at the level they choose  - Honor patient and family perspectives and  choices  Outcome: Progressing     Problem: Patient/Family Goals  Goal: Patient/Family Long Term Goal  Description: Patient's Long Term Goal:     Interventions:  -   - See additional Care Plan goals for specific interventions  Outcome: Progressing  Goal: Patient/Family Short Term Goal  Description: Patient's Short Term Goal:     Interventions:   -   - See additional Care Plan goals for specific interventions  Outcome: Progressing

## 2024-01-21 NOTE — PROGRESS NOTES
[facility]    OB/GYNE Progress Note      Ruthie Morrow Patient Status:  Inpatient    3/17/2000 MRN [id number]   Location [facility] 3SE Attending Candi Taveras, AMA   Hosp Day # 1 PCP NORMAN BURNETT MD        Assessment/Plan   Ruthie Morrow is a 23 year old , day 1 after a vaginal delivery  Breastfeeding and Formula feeding. Having some difficulty getting baby latched. Plan to work with lactation today  Discharge home anticipated tomorrow  Postpartum teaching completed including danger signs and when to call the CNM      Obesity in pregnancy  Delivered      Diet controlled gestational diabetes mellitus (GDM) in third trimester  Delivered  Plan 2hr GTT after 6wks postpartum      Normal labor and delivery   Normal PPD #1      Postpartum anemia   Initiate oral iron        Discussed with: nurse     patient     Plan discussed with patient who verbalizes understanding and agreement.        Subjective   Currently she denies excessive bleeding or pain. No clots. Denies SOB, chest pain, HA, dizziness. + void. No stool  Support at home: partner and family  Has car seat   Desires POPs for birth control    Review of Systems:  Constitutional: Denies   Genitourinary: Denies   Respiratory: Denies   Psychiatric: Denies         Objective   Vital signs in last 24 hours:  Temp:  [97.7 °F (36.5 °C)-99.4 °F (37.4 °C)] 98.4 °F (36.9 °C)  Pulse:  [] 117  Resp:  [16] 16  BP: ()/(61-86) 120/75  SpO2:  [98 %-99 %] 98 %    Input/Output:    Intake/Output Summary (Last 24 hours) at 2024 0903  Last data filed at 2024 2205  Gross per 24 hour   Intake 1224.31 ml   Output 2000 ml   Net -775.69 ml       Weight (Last 6):  Wt Readings from Last 6 Encounters:   24 186 lb (84.4 kg)   24 186 lb (84.4 kg)   24 187 lb (84.8 kg)   24 185 lb (83.9 kg)   23 181 lb (82.1 kg)   23 181 lb 12.8 oz (82.5 kg)     Body mass index is 37.57 kg/m².      Exam:  Constitutional: Comfortable and bonding well with infant   Uterus: Fundus firm, below umbilicus   Wound/Incision: Well-approximated, no swelling or edema, small lochia rubra, no clots   Respiratory: Unlabored respirations  Extremities: No edema. No evidence of DVT on exam   Psychiatric: Calm affect, appropriate     DVT Risk Score: Low risk        Results:     Lab Results   Component Value Date    TREPONEMALAB Negative 10/27/2023    ABO B 01/20/2024    RH Positive 01/20/2024    WBC 13.6 (H) 01/21/2024    HGB 10.3 (L) 01/21/2024    HCT 33.1 (L) 01/21/2024    .0 01/21/2024    CREATSERUM 0.49 (L) 06/20/2023    BUN 8 06/20/2023     06/20/2023    K 4.1 06/20/2023     06/20/2023    CO2 23.0 06/20/2023    GLU 99 06/20/2023    CA 9.2 06/20/2023    ALB 3.3 (L) 06/20/2023    ALKPHO 91 06/20/2023    BILT 0.2 06/20/2023    TP 6.9 06/20/2023    AST 16 06/20/2023    ALT 23 06/20/2023    PTT 27.0 05/08/2019    INR 1.09 05/08/2019    TSH 1.12 09/11/2018       Lab Results   Component Value Date    COLORUR Yellow 06/20/2023    CLARITY Clear 06/20/2023    SPECGRAVITY 1.018 06/20/2023    PROUR Negative 06/20/2023    GLUUR Normal 06/20/2023    KETUR Negative 06/20/2023    BILUR Negative 06/20/2023    BLOODURINE 2+ (A) 06/20/2023    NITRITE Negative 06/20/2023    UROBILINOGEN Normal 06/20/2023    LEUUR Negative 06/20/2023    UASA Negative 05/08/2019       No results found.          Lo Dumont CNM  1/21/2024  9:03 AM

## 2024-01-21 NOTE — PLAN OF CARE
Problem: Patient Centered Care  Goal: Patient preferences are identified and integrated in the patient's plan of care  Description: Interventions:  - What would you like us to know as we care for you?   - Provide timely, complete, and accurate information to patient/family  - Incorporate patient and family knowledge, values, beliefs, and cultural backgrounds into the planning and delivery of care  - Encourage patient/family to participate in care and decision-making at the level they choose  - Honor patient and family perspectives and choices  Outcome: Progressing     Problem: POSTPARTUM  Goal: Long Term Goal:Experiences normal postpartum course  Description: INTERVENTIONS:  - Assess and monitor vital signs and lab values.  - Assess fundus and lochia.  - Provide ice/sitz baths for perineum discomfort.  - Monitor healing of incision/episiotomy/laceration, and assess for signs and symptoms of infection and hematoma.  - Assess bladder function and monitor for bladder distention.  - Provide/instruct/assist with pericare as needed.  - Provide VTE prophylaxis as needed.  - Monitor bowel function.  - Encourage ambulation and provide assistance as needed.  - Assess and monitor emotional status and provide social service/psych resources as needed.  - Utilize standard precautions and use personal protective equipment as indicated. Ensure aseptic care of all intravenous lines and invasive tubes/drains.  - Obtain immunization and exposure to communicable diseases history.  Outcome: Progressing  Goal: Optimize infant feeding at the breast  Description: INTERVENTIONS:  - Initiate breast feeding within first hour after birth.   - Monitor effectiveness of current breast feeding efforts.  - Assess support systems available to mother/family.  - Identify cultural beliefs/practices regarding lactation, letdown techniques, maternal food preferences.  - Assess mother's knowledge and previous experience with breast feeding.  - Provide  information as needed about early infant feeding cues (e.g., rooting, lip smacking, sucking fingers/hand) versus late cue of crying.  - Discuss/demonstrate breast feeding aids (e.g., infant sling, nursing footstool/pillows, and breast pumps).  - Encourage mother/other family members to express feelings/concerns, and actively listen.  - Educate father/SO about benefits of breast feeding and how to manage common lactation challenges.  - Recommend avoidance of specific medications or substances incompatible with breast feeding.  - Assess and monitor for signs of nipple pain/trauma.  - Instruct and provide assistance with proper latch.  - Review techniques for milk expression (breast pumping) and storage of breast milk. Provide pumping equipment/supplies, instructions and assistance, as needed.  - Encourage rooming-in and breast feeding on demand.  - Encourage skin-to-skin contact.  - Provide LC support as needed.  - Assess for and manage engorgement.  - Provide breast feeding education handouts and information on community breast feeding support.   Outcome: Progressing  Goal: Establishment of adequate milk supply with medication/procedure interruptions  Description: INTERVENTIONS:  - Review techniques for milk expression (breast pumping).   - Provide pumping equipment/supplies, instructions, and assistance until it is safe to breastfeed infant.  Outcome: Progressing  Goal: Appropriate maternal -  bonding  Description: INTERVENTIONS:  - Assess caregiver- interactions.  - Assess caregiver's emotional status and coping mechanisms.  - Encourage caregiver to participate in  daily care.  - Assess support systems available to mother/family.  - Provide /case management support as needed.  Outcome: Progressing

## 2024-01-22 NOTE — PROGRESS NOTES
[facility]    OB/GYNE Progress Note      Ruthie Morrow Patient Status:  Inpatient    3/17/2000 MRN [id number]   Location [facility] 3SE Attending Candi Taveras CNM   Hosp Day # 2 PCP NORMAN BURNETT MD        Assessment/Plan     Obesity in pregnancy        Diet controlled gestational diabetes mellitus (GDM) in third trimester  -Plan for 2 hr PP at 6 wk PP      Vaginal delivery  -Stable PP Day 2. Discharge instructions and warning signs reviewed. F/u with CNM in 2 wks for video visit and 6 wks for in person visit.  Encouraged to pump q 2-3 hours and continue to try to get baby to latch. Discussed measures to help with pump comfort and flange fit.      Postpartum anemia  -Home on iron every other day          Discussed with: {     nurse     patient and partner     Plan discussed with patient who verbalizes understanding and agreement.        Subjective   Feeling well. Bleeding decreasing. Bottle feeding. Tried to pump but not getting much out. Has some bruising from pump.     Review of Systems:      Gynecological: lochia  Gastrointestinal: denies  Genitourinary: denies  Constitutional: denies  Cardiovascular: denies  Respiratory: denies  Neurologic: denies  Psychiatric: denies        Objective   Vital signs in last 24 hours:  Temp:  [97.9 °F (36.6 °C)-98.7 °F (37.1 °C)] 98.7 °F (37.1 °C)  Pulse:  [77-90] 77  Resp:  [16-18] 16  BP: (116-120)/(67-74) 120/74    Input/Output:  No intake or output data in the 24 hours ending 24 0841    Weight (Last 6):  Wt Readings from Last 6 Encounters:   24 186 lb (84.4 kg)   24 186 lb (84.4 kg)   24 187 lb (84.8 kg)   24 185 lb (83.9 kg)   23 181 lb (82.1 kg)   23 181 lb 12.8 oz (82.5 kg)     Body mass index is 37.57 kg/m².     Exam:  Breasts: Bilateral bruising, no cracking or bleeding, + milky discharge  Fundus firm, non-tender, 1FB below umbilicus  Lochia: small rubra  Perineum: well approximated, no  swelling, no hematoma  Calves: Non-tender, no edema, Neg Homans       Nath Catheter Information  Does patient have a nath catheter: no  Has the nath catheter been removed: Not Applicable          Results:     Lab Results   Component Value Date    TREPONEMALAB Negative 10/27/2023    ABO B 01/20/2024    RH Positive 01/20/2024    WBC 13.6 (H) 01/21/2024    HGB 10.3 (L) 01/21/2024    HCT 33.1 (L) 01/21/2024    .0 01/21/2024    CREATSERUM 0.49 (L) 06/20/2023    BUN 8 06/20/2023     06/20/2023    K 4.1 06/20/2023     06/20/2023    CO2 23.0 06/20/2023    GLU 99 06/20/2023    CA 9.2 06/20/2023    ALB 3.3 (L) 06/20/2023    ALKPHO 91 06/20/2023    BILT 0.2 06/20/2023    TP 6.9 06/20/2023    AST 16 06/20/2023    ALT 23 06/20/2023    PTT 27.0 05/08/2019    INR 1.09 05/08/2019    TSH 1.12 09/11/2018       Lab Results   Component Value Date    COLORUR Yellow 06/20/2023    CLARITY Clear 06/20/2023    SPECGRAVITY 1.018 06/20/2023    PROUR Negative 06/20/2023    GLUUR Normal 06/20/2023    KETUR Negative 06/20/2023    BILUR Negative 06/20/2023    BLOODURINE 2+ (A) 06/20/2023    NITRITE Negative 06/20/2023    UROBILINOGEN Normal 06/20/2023    LEUUR Negative 06/20/2023    UASA Negative 05/08/2019       No results found.          Candi Taveras CNM  1/22/2024  8:41 AM

## 2024-01-22 NOTE — PLAN OF CARE
Problem: PAIN - ADULT  Goal: Verbalizes/displays adequate comfort level or patient's stated pain goal  Description: INTERVENTIONS:  - Encourage pt to monitor pain and request assistance  - Assess pain using appropriate pain scale  - Administer analgesics based on type and severity of pain and evaluate response  - Implement non-pharmacological measures as appropriate and evaluate response  - Consider cultural and social influences on pain and pain management  - Manage/alleviate anxiety  - Utilize distraction and/or relaxation techniques  - Monitor for opioid side effects  - Notify MD/LIP if interventions unsuccessful or patient reports new pain  - Anticipate increased pain with activity and pre-medicate as appropriate  Outcome: Progressing     Problem: ANXIETY  Goal: Will report anxiety at manageable levels  Description: INTERVENTIONS:  - Administer medication as ordered  - Teach and rehearse alternative coping skills  - Provide emotional support with 1:1 interaction with staff  Outcome: Progressing     Problem: Patient Centered Care  Goal: Patient preferences are identified and integrated in the patient's plan of care  Description: Interventions:  - What would you like us to know as we care for you?   - Provide timely, complete, and accurate information to patient/family  - Incorporate patient and family knowledge, values, beliefs, and cultural backgrounds into the planning and delivery of care  - Encourage patient/family to participate in care and decision-making at the level they choose  - Honor patient and family perspectives and choices  Outcome: Progressing       Problem: POSTPARTUM  Goal: Long Term Goal:Experiences normal postpartum course  Description: INTERVENTIONS:  - Assess and monitor vital signs and lab values.  - Assess fundus and lochia.  - Provide ice/sitz baths for perineum discomfort.  - Monitor healing of incision/episiotomy/laceration, and assess for signs and symptoms of infection and  hematoma.  - Assess bladder function and monitor for bladder distention.  - Provide/instruct/assist with pericare as needed.  - Provide VTE prophylaxis as needed.  - Monitor bowel function.  - Encourage ambulation and provide assistance as needed.  - Assess and monitor emotional status and provide social service/psych resources as needed.  - Utilize standard precautions and use personal protective equipment as indicated. Ensure aseptic care of all intravenous lines and invasive tubes/drains.  - Obtain immunization and exposure to communicable diseases history.  Outcome: Progressing  Goal: Optimize infant feeding at the breast  Description: INTERVENTIONS:  - Initiate breast feeding within first hour after birth.   - Monitor effectiveness of current breast feeding efforts.  - Assess support systems available to mother/family.  - Identify cultural beliefs/practices regarding lactation, letdown techniques, maternal food preferences.  - Assess mother's knowledge and previous experience with breast feeding.  - Provide information as needed about early infant feeding cues (e.g., rooting, lip smacking, sucking fingers/hand) versus late cue of crying.  - Discuss/demonstrate breast feeding aids (e.g., infant sling, nursing footstool/pillows, and breast pumps).  - Encourage mother/other family members to express feelings/concerns, and actively listen.  - Educate father/SO about benefits of breast feeding and how to manage common lactation challenges.  - Recommend avoidance of specific medications or substances incompatible with breast feeding.  - Assess and monitor for signs of nipple pain/trauma.  - Instruct and provide assistance with proper latch.  - Review techniques for milk expression (breast pumping) and storage of breast milk. Provide pumping equipment/supplies, instructions and assistance, as needed.  - Encourage rooming-in and breast feeding on demand.  - Encourage skin-to-skin contact.  - Provide LC support as  needed.  - Assess for and manage engorgement.  - Provide breast feeding education handouts and information on community breast feeding support.   Outcome: Progressing  Goal: Establishment of adequate milk supply with medication/procedure interruptions  Description: INTERVENTIONS:  - Review techniques for milk expression (breast pumping).   - Provide pumping equipment/supplies, instructions, and assistance until it is safe to breastfeed infant.  Outcome: Progressing  Goal: Experiences normal breast weaning course  Description: INTERVENTIONS:  - Assess for and manage engorgement.  - Instruct on breast care.  - Provide comfort measures.  Outcome: Progressing  Goal: Appropriate maternal -  bonding  Description: INTERVENTIONS:  - Assess caregiver- interactions.  - Assess caregiver's emotional status and coping mechanisms.  - Encourage caregiver to participate in  daily care.  - Assess support systems available to mother/family.  - Provide /case management support as needed.  Outcome: Progressing

## 2024-01-22 NOTE — DISCHARGE SUMMARY
[facility]    Discharge Summary    Ruthie Morrow Patient Status:  Inpatient    3/17/2000 MRN [id number]   Location [facility] 3SE Attending Candi Taveras CNM   Hosp Day # 2       Delivering OB Clinician: Lo Dumont CNM    EDC: Estimated Date of Delivery: 24    Gestational Age: 39w1d    Antepartum complications:   Patient Active Problem List   Diagnosis    Pregnancy    Obesity in pregnancy    Large for dates    Diet controlled gestational diabetes mellitus (GDM) in third trimester    Normal labor and delivery    Vaginal delivery    Postpartum anemia       Date of Delivery: 2024  Time of Delivery: 9:03 AM     Delivery Type: spontaneous vaginal delivery    Baby: Liveborn male   Information for the patient's :  Jerson Beckham [[id number]]   7 lb 13 oz (3.544 kg)   Apgars:  1 minute: 8   5 minutes: 9 10 minutes:       Intrapartum Complications: Gestational Diabetes, diet controlled    Admit Date: 2024    Discharge Date: 2024    Hospital Course: No complications Routine delivery and postpartum care    Discharged Condition: stable PP Day 2. Bottle feeding and pumping.    Disposition: home    Plan:     Follow-up appointment in 2 weeks with AMA Calle CNM  2024  8:44 AM

## 2024-01-22 NOTE — DISCHARGE INSTRUCTIONS
Discharge Instructions for Vaginal Delivery  Follow-up  Schedule a  follow-up exam with the midwife who delivered you in 2 weeks and 6 weeks. Please remember to call as soon as possible for this appointment. After having a baby, your body may be very tired. It can take time to recover from a vaginal delivery. Please remember this and call if you have any questions or concerns before your 6 week appointment.   Medications    Please take Motrin at home for pain control 600mg every 6 hours as needed  Continue taking your Prenatal Vitamin daily, as long as you are nursing  Ferrous Sulfate 325 mg once every other day (may obtain in form of Gentle Iron, Slow FE, or liquid Floradix)  Vitamin D3 2000 IU daily  Colace (stool softener)  once or twice per day as needed  If you have stitches  You may have received stitches in the skin near your vagina. The stitches might have closed an episiotomy (an incision that enlarges the opening of the vagina). Or you may have needed stitches to repair torn skin. Either way, your stitches should dissolve within weeks. Until then, you can help reduce discomfort, aid healing, and reduce your risk of infection by keeping the stitches clean. These tips can help:  Gently wipe from front to back after you urinate or have a bowel movement.  After wiping, spray warm water on the area. Or you can have a sitz bath. This means sitting in a tub with a few inches of water in it. Then pat the area dry or use a hairdryer on a cool setting.  You can take a shower unless told not to.  Change sanitary pads at least every 2 to 4 hours.  Place cold packs as need, but remember to keep a thin towel between the pack and your skin.  Activity  Here are some suggestions:  Get lots of rest. Take naps as often as your can.   Increase your activities gradually.   Don’t have sexual intercourse until after you’ve had a follow-up appointment and you have decided on a birth control method.  Remember your are on  pelvic rest, so nothing in your vagina (tampons etc...), no tub baths, and no swimming pools.       When to call your healthcare provider  Call your health care provider right away if you have:  A fever of 100.4°F (38.0°C) or higher  Bleeding that requires a new sanitary pad after an hour, or large blood clots. Remember your bleeding will wax and wane. Please call if you are consistently saturating a pad and hour.   Pain in your vagina that gets worse and isn't relieved with medicine.  Burning, pain, red streaks, or lumpy areas in your breasts that may be accompanied by flu-like symptoms  Nausea or vomiting  Dizziness or fainting  Feelings of extreme sadness or anxiety, or a feeling that you don’t want to be with your baby  Abdominal pain that isn’t relieved with medicine  No bowel movement for 5 days  Redness, warmth, or pain in the lower leg  Chest pain       Follow-up appointment in 2 weeks with Lo Dumont CNM

## 2024-01-22 NOTE — CM/SW NOTE
SW self referral due to finances/WIC resources    SW met with patient and FOB bedside.  SW confirmed face sheet contact as correct.    Baby boy/girl name:Baby boy: Kumar  Date & time of delivery:1/20/24 @ 9:03am  Delivery method:Normal spontaneous vaginal delivery   Siblings age:n/a    Patient employed: Yes  Length of maternity leave:12 weeks    Father of baby employed:Yes  Length of paternity leave:Denied    Breast or formula feed:Breast and formula feed    Pediatrician:JESSICA  SW encouraged pt to schedule infant first appointment (usually within 48 hours of discharge) prior to pt discharge. Pt expressed understanding.     Infant Insurance:Medicaid  [employer] [facility] contacted:Yes    Mental Health History:Denied    Medications:n/a    Therapist:n/a    Psychiatrist:n/a    SW discussed signs, symptoms and risks associated with post partum depression & anxiety.  [facility].  Other resources provided:[employer] transportation and mental health resources.  [facility] specific resources.    Patient support system:FOB and pt's parents    Patient denied current questions/needs from ANTONINO.    SW/CM to remain available for support and/or discharge planning.      Lo Trimble, MSW, LSW  Social Work   Ext:#[phone]

## 2024-01-22 NOTE — PAYOR COMM NOTE
--------------  ADMISSION REVIEW     Payor: [facility]  Subscriber #:  [id number]  Authorization Number: [id number]    Admit date: 24  Admit time: 12:30 AM         History & Physical    HPI:   Ruthie Morrow is a 23 year old female  current EGA of 39w1d with an estimated date of delivery of: 2024, by Ultrasound      Ruthie Morrow is being admitted for labor management.    Her current obstetrical history is significant for gestational DM, obesity.    Patient reports contractions since 0930 am  .     Fetal Movement: normal.     History   Obstetric History:   OB History    Para Term  AB Living   1 0 0 0 0 0   SAB IAB Ectopic Multiple Live Births   0 0 0 0 0      # Outcome Date GA Lbr Binh/2nd Weight Sex Delivery Anes PTL Lv   1 Current              Past Medical History:   Past Medical History:   Diagnosis Date    Anemia     Gestational diabetes     Iron deficiency anemia due to chronic blood loss 2019    Irregular menses 2019    Yeast vaginitis 09/10/2020     Assessment/Plan:    Ruthie Morrow is at an estimated gestational age of 39w1d with an estimated date of delivery of:  2024, by Ultrasound    Early latent labor.  Obstetrical history significant for gestational DM, obesity.    Risks, benefits, alternatives and possible complications have been discussed in detail with the patient.   Pre-admission, admission, and post admission procedures and expectations were discussed in detail.    All questions answered, all appropriate consents will be signed at the Hospital. Admission is planned for today.   Analgesia: IM/IV narcotic.         9:03 AM BOY

## 2024-01-22 NOTE — PLAN OF CARE
Problem: PAIN - ADULT  Goal: Verbalizes/displays adequate comfort level or patient's stated pain goal  Description: INTERVENTIONS:  - Encourage pt to monitor pain and request assistance  - Assess pain using appropriate pain scale  - Administer analgesics based on type and severity of pain and evaluate response  - Implement non-pharmacological measures as appropriate and evaluate response  - Consider cultural and social influences on pain and pain management  - Manage/alleviate anxiety  - Utilize distraction and/or relaxation techniques  - Monitor for opioid side effects  - Notify MD/LIP if interventions unsuccessful or patient reports new pain  - Anticipate increased pain with activity and pre-medicate as appropriate  1/22/2024 1206 by Allison Huertas RN  Outcome: Adequate for Discharge  1/22/2024 1102 by Allison Huertas RN  Outcome: Progressing     Problem: ANXIETY  Goal: Will report anxiety at manageable levels  Description: INTERVENTIONS:  - Administer medication as ordered  - Teach and rehearse alternative coping skills  - Provide emotional support with 1:1 interaction with staff  1/22/2024 1206 by Allison Huertas RN  Outcome: Adequate for Discharge  1/22/2024 1102 by Allison Huertas RN  Outcome: Progressing     Problem: Patient Centered Care  Goal: Patient preferences are identified and integrated in the patient's plan of care  Description: Interventions:  - What would you like us to know as we care for you?   Problem: POSTPARTUM  Goal: Long Term Goal:Experiences normal postpartum course  Description: INTERVENTIONS:  - Assess and monitor vital signs and lab values.  - Assess fundus and lochia.  - Provide ice/sitz baths for perineum discomfort.  - Monitor healing of incision/episiotomy/laceration, and assess for signs and symptoms of infection and hematoma.  - Assess bladder function and monitor for bladder distention.  - Provide/instruct/assist with pericare as needed.  - Provide VTE prophylaxis as needed.  - Monitor  bowel function.  - Encourage ambulation and provide assistance as needed.  - Assess and monitor emotional status and provide social service/psych resources as needed.  - Utilize standard precautions and use personal protective equipment as indicated. Ensure aseptic care of all intravenous lines and invasive tubes/drains.  - Obtain immunization and exposure to communicable diseases history.  1/22/2024 1206 by Allison Huertas, RN  Outcome: Adequate for Discharge  1/22/2024 1102 by Allison Huertas RN  Outcome: Progressing  Goal: Optimize infant feeding at the breast  Description: INTERVENTIONS:  - Initiate breast feeding within first hour after birth.   - Monitor effectiveness of current breast feeding efforts.  - Assess support systems available to mother/family.  - Identify cultural beliefs/practices regarding lactation, letdown techniques, maternal food preferences.  - Assess mother's knowledge and previous experience with breast feeding.  - Provide information as needed about early infant feeding cues (e.g., rooting, lip smacking, sucking fingers/hand) versus late cue of crying.  - Discuss/demonstrate breast feeding aids (e.g., infant sling, nursing footstool/pillows, and breast pumps).  - Encourage mother/other family members to express feelings/concerns, and actively listen.  - Educate father/SO about benefits of breast feeding and how to manage common lactation challenges.  - Recommend avoidance of specific medications or substances incompatible with breast feeding.  - Assess and monitor for signs of nipple pain/trauma.  - Instruct and provide assistance with proper latch.  - Review techniques for milk expression (breast pumping) and storage of breast milk. Provide pumping equipment/supplies, instructions and assistance, as needed.  - Encourage rooming-in and breast feeding on demand.  - Encourage skin-to-skin contact.  - Provide LC support as needed.  - Assess for and manage engorgement.  - Provide breast feeding  education handouts and information on community breast feeding support.   2024 1206 by Allison Huertas RN  Outcome: Adequate for Discharge  2024 110 by Allison Huertas RN  Outcome: Progressing  Goal: Establishment of adequate milk supply with medication/procedure interruptions  Description: INTERVENTIONS:  - Review techniques for milk expression (breast pumping).   - Provide pumping equipment/supplies, instructions, and assistance until it is safe to breastfeed infant.  2024 120 by Allison Huertas RN  Outcome: Adequate for Discharge  2024 110 by Allison Huertas RN  Outcome: Progressing  Goal: Experiences normal breast weaning course  Description: INTERVENTIONS:  - Assess for and manage engorgement.  - Instruct on breast care.  - Provide comfort measures.  2024 by Allison Huertas RN  Outcome: Adequate for Discharge  2024 by Allison Huertas RN  Outcome: Progressing  Goal: Appropriate maternal -  bonding  Description: INTERVENTIONS:  - Assess caregiver- interactions.  - Assess caregiver's emotional status and coping mechanisms.  - Encourage caregiver to participate in  daily care.  - Assess support systems available to mother/family.  - Provide /case management support as needed.  2024 120 by Allison Huertas RN  Outcome: Adequate for Discharge  2024 by Allison Huertas RN  Outcome: Progressing     - Provide timely, complete, and accurate information to patient/family  - Incorporate patient and family knowledge, values, beliefs, and cultural backgrounds into the planning and delivery of care  - Encourage patient/family to participate in care and decision-making at the level they choose  - Honor patient and family perspectives and choices  2024 120 by Allison Huertas RN  Outcome: Adequate for Discharge  2024 by Allison Huertas RN  Outcome: Progressing

## 2024-02-06 NOTE — PROGRESS NOTES
This visit is conducted using Telemedicine with live, interactive video and audio.    Patient has been referred to [facility] website at [email or web address] to review the yearly Consent to Treat document.    Patient understands and accepts financial responsibility for any deductible, co-insurance, and/or co-pays associated with this service.     Subjective: Ruthie is 2 weeks postpartum after a vaginal delivery of a baby boy.  See L&D delivery summary for birth statistics.  She is without concerns today. Bleeding is decreasing and not experiencing any excessive pain.    She is formula feeding and baby is gaining weight    She reports some baby blues the first week or so but this has improved. She has moved in with her parents for extra support.  Denies any abuse.    Contraceptive plan: COCs (tri lo sprintec)    Review of Systems   Constitutional:  Negative for fever.   Genitourinary:  Negative for dysuria, frequency and urgency.   Psychiatric/Behavioral:  Negative for depression. The patient is not nervous/anxious.        Objective:   There were no vitals filed for this visit.  Wt Readings from Last 6 Encounters:   01/20/24 186 lb (84.4 kg)   01/19/24 186 lb (84.4 kg)   01/18/24 187 lb (84.8 kg)   01/11/24 185 lb (83.9 kg)   12/29/23 181 lb (82.1 kg)   12/28/23 181 lb 12.8 oz (82.5 kg)       Physical Exam  Constitutional:       General: She is not in acute distress.     Appearance: Normal appearance. She is not ill-appearing, toxic-appearing or diaphoretic.   Pulmonary:      Effort: Pulmonary effort is normal.   Neurological:      Mental Status: She is alert and oriented to person, place, and time.   Psychiatric:         Mood and Affect: Mood normal.         Behavior: Behavior normal.         Thought Content: Thought content normal.         Judgment: Judgment normal.          Assessment/Plan:   2 weeks postpartum, stable. Formula feeding without difficulty  Contraception: COCs  Return to clinic: for 6 week postpartum  visit    Counseling included:   Signs/symptoms of postpartum depression  Postpartum danger signs  Lactation support and troubleshooting  Maternal and family adaption  Sleep/rest strategies  Sexuality  Infant safety and care  Parenting concerns  Occupational concerns  Contraception    Ruthie verbalized understanding of plan of care. All questions answered. No barriers to learning identified.

## 2024-03-04 NOTE — PROGRESS NOTES
Chief Complaint   Patient presents with    Postpartum Care     Male 7lb 13oz, Vaginal delivery, interested in OCP,  GAD7 score 1             HPI:   Ruthie is 23 year old , here today for 6-week postpartum visit.  Type and date of Delivery: 24 vaginal delivery, Complications: GDM in pregnancy  See Delivery Summary for baby's gender and weight  Perineum: bleeding stopped, no pain  Feeding Method: bottle feeding going well. Baby gaining well  Bonding: yes  Maternal sleep: 7 hours/night. Napping: yes  Sexual activity resumed: no. Contraceptive Method: OCPs  Postpartum Depression screen: GAD7=1; EPDS=0  Feels safe with partner    Denies history of migraine with aura or personal/family history of blood clots/stroke    Pt offered for MA to be present during exam and patient declines      HISTORY:  Patient Active Problem List   Diagnosis    Pregnancy (HCC)    Obesity in pregnancy (HCC)    Large for dates (HCC)    Diet controlled gestational diabetes mellitus (GDM) in third trimester (HCC)    Normal labor and delivery (HCC)    Vaginal delivery (HCC)    Postpartum anemia (HCC)    Uterine contractions (HCC)    Decreased fetal movements in third trimester (HCC)       Wt Readings from Last 6 Encounters:   24 168 lb (76.2 kg)   24 186 lb (84.4 kg)   24 186 lb (84.4 kg)   24 187 lb (84.8 kg)   24 185 lb (83.9 kg)   23 181 lb (82.1 kg)       36 lb (16.3 kg)    Medications (Active prior to today's visit):  Current Outpatient Medications   Medication Sig Dispense Refill    Norgestim-Eth Estrad Triphasic (TRI-LO-SPRINTEC) 0.18/0.215/0.25 MG-25 MCG Oral Tab Take 1 tablet by mouth daily. 84 tablet 4    ibuprofen 600 MG Oral Tab Take 1 tablet (600 mg total) by mouth every 6 (six) hours as needed for Pain. 30 tablet 0    prenatal vitamin with DHA 27-0.8-228 MG Oral Cap Take 1 capsule by mouth daily.      Blood Glucose Monitoring Suppl (ONETOUCH VERIO FLEX SYSTEM) w/Device Does not apply Kit  1 kit 4 (four) times daily. (Patient not taking: Reported on 3/4/2024) 1 kit 0    Lancets (ONETOUCH DELICA PLUS [id number]) Does not apply Misc 4 each 4 (four) times daily. Test morning and 2 hours after each meal. (Patient not taking: Reported on 3/4/2024) 100 each 3       Allergies:   No Known Allergies    PHYSICAL EXAM:   Vitals:    03/04/24 1720   BP: 110/76   Pulse: 76     Physical Exam  Vitals and nursing note reviewed.   Constitutional:       General: She is not in acute distress.     Appearance: Normal appearance. She is not ill-appearing, toxic-appearing or diaphoretic.   Cardiovascular:      Pulses: Normal pulses.   Pulmonary:      Effort: Pulmonary effort is normal.   Abdominal:      Comments: 1 finger width separation of abdominal muscles   Genitourinary:     General: Normal vulva.      Labia:         Right: No rash, tenderness, lesion or injury.         Left: No rash, tenderness, lesion or injury.       Comments: Laceration well healed  Weakness of pelvic floor muscles when instructed to demonstrate a kegel  Neurological:      Mental Status: She is alert and oriented to person, place, and time.   Psychiatric:         Mood and Affect: Mood normal.         Behavior: Behavior normal.         Thought Content: Thought content normal.         Judgment: Judgment normal.          No results found for this or any previous visit (from the past 24 hour(s)).       ASSESSMENT/PLAN:   Ruthie was seen today for postpartum care.    Diagnoses and all orders for this visit:    Postpartum care and examination (HCC)    History of gestational diabetes  -     Glucose Tolerance, 75 gm (0 hr, 1 hr, 2hr), Gestational (ADA); Future    BCP (birth control pills) initiation    Other orders  -     Norgestim-Eth Estrad Triphasic (TRI-LO-SPRINTEC) 0.18/0.215/0.25 MG-25 MCG Oral Tab; Take 1 tablet by mouth daily.         Start Taking               Norgestim-Eth Estrad Triphasic (TRI-LO-SPRINTEC) 0.18/0.215/0.25 MG-25 MCG Oral Tab Take 1  tablet by mouth daily.          Oral contraception   Discussed importance of taking everyday around the same time   Encouraged condoms the first 7 days   Condoms for continued STI prevention   Reviewed warnings signs, including ACHES   Discussed side effects. Encouraged patient to allow 3 months for adjustment if troublesome side effects continue, return to care to discuss possible adjustment of pill     Discussed PT for weak pelvic floor. Patient declines at this time.    Next annual due: 3-6 months      Counseling:   Postpartum teaching including maternal and family adaptation, sleep/rest strategies, lactation and weaning (if applicable), sexuality/contraception, importance of Kegel's and abdominal exercises, continuation of prenatal vitamins, and signs/symptoms of postpartum depression  Patient verbalized understanding, All questions answered. No barriers to learning identified

## 2024-05-22 NOTE — TELEPHONE ENCOUNTER
Patient needs proof of maternity leave , for Unemployment   Fax [phone]    Include  att [employer]    Claim  ID # [id number]

## 2024-05-22 NOTE — TELEPHONE ENCOUNTER
Per pt she didn't go back to work after she delivered her child on 1/20/24 with RUTHY.Vaginal delivery. Pt reported she is now applying for unemployment and is being asked to provide a proof that pt delivered and she was absent for maternity leave for three months following her delivery. Pt informed request would be send to provider for consideration.

## 2024-05-23 NOTE — TELEPHONE ENCOUNTER
Patient called to check on the status of the note for her job.   Patient request a copy to be uploaded to my chart.    Request a nurse to call

## 2024-09-05 NOTE — PATIENT INSTRUCTIONS
ORAL CONTRACEPTIVE PILL INSTRUCTION    The name of my oral contraceptive pill is Tri-lo-sprintec    When do I start my “pills”?  On the day your period starts  The Sunday after your period starts whether or not your period has already stopped.  If your period begins on Sunday, take your first pill that day.  5 days after your period begins.    What time should I take my pill?  Take your pill the same time every day.  This will improve it’s effectiveness and will help you remember to take your pill daily.’  If you have nausea when taking your pill, take it at bedtime with some food    What kind of side effects can occur and what can I do about them?  Nausea - take the pill with food and eat a bit more in an hour. Take the pill at bedtime with a snack.  Bloating, breast tenderness - decrease caffeine intake, especially during the third week of pills. Take a multivitamin daily.  Bleeding between periods - take the pill at the same time daily and use a back-up method of contraception.  Some spotting or breakthrough bleeding during the first 3 months is normal.  If bleeding persists after the first three months, call the office.  Mood changes - take a multivitamin daily.  If persists past the third pack of pills discuss this with your health care provider.    What can decrease the effectiveness of the pill  Forgetting to take the pill.  An illness that causes vomiting and/or diarrhea.  If you vomit within 3 hours of taking your pill, take another pill from your pack and call the office to get an additional pack of pills.  Some medications - antibiotics, anticonvulsants, sedatives, antidepressants, and Pramod’s Wort    What should I do if I forget to take my pills  One pill - take as soon as you remember if not until the next day take two pills that day.  Two pills - take 2 pills a day for 2 days in a row, by the third day you will be back on schedule  If you miss pills you should use a back-up method of contraception,  such as condoms until your next pack of pills.  If you miss more than 2 pills. Call the office for instructions.    When should I use additional contraception?  Use a back-up with the first pack of pills  Use a back-up if you miss more than one pill in a pack  Use a back-up if you have bleeding during your active pills  Use a back-up for safe sexual practice, to prevent disease  Use a back-up if you are taking a medication that decreases the pills effectiveness    DANGER SIGNS - CALL THE OFFICE [phone]   A - Abdominal pain (severe)  C - Chest pain (severe)  H - Headaches (severe)  E - Eye problems (loss of vision, blurring of vision)  S - Severe leg pain in the calf or thigh

## 2024-09-05 NOTE — PROGRESS NOTES
Chief Complaint:   Chief Complaint   Patient presents with    Annual     Pt is here for an annual physical no concerns for provider.         HPI:     Ruthie is 24 year old female, here today for annual exam  Patient's last menstrual period was 08/15/2024 (approximate).. Menses regular, lasting 3 days.  Hx Prior Abnormal Pap: No  Pap Date: 07/03/23  Pap Result Notes: WNL   Denies abnormal discharge or vaginal irritation.     Current Partners: none. Has not been sexually active since giving birth.  Birth Control Method: OCPs, happy with method, denies side effects  H/O of STI's: none  Last STI screen: 1/1/24  Declines this testing today  Additional information:      Last Pap: 7/3/23 NILM      H/O abnormal Pap: no      Last mammogram (if applicable): n/a    Denies family history of breast or ovarian cancer.     Denies drinking   Denies smoking   Denies drug use     HISTORY:  Past Medical History:    Anemia    Gestational diabetes (HCC)    Iron deficiency anemia due to chronic blood loss    Irregular menses    With excessive bleeding    Yeast vaginitis      No past surgical history on file.   Family History   Problem Relation Age of Onset    Lipids Father       Social History:   Social History     Socioeconomic History    Marital status: Single   Tobacco Use    Smoking status: Never     Passive exposure: Current (monthly)    Smokeless tobacco: Never   Substance and Sexual Activity    Alcohol use: Not Currently     Comment: occ    Drug use: Never     Comment: once every 5 months     Social Determinants of Health     Financial Resource Strain: Low Risk  (1/20/2024)    Financial Resource Strain     Difficulty of Paying Living Expenses: Not hard at all     Med Affordability: No   Food Insecurity: No Food Insecurity (1/20/2024)    Food Insecurity     Food Insecurity: Never true   Transportation Needs: No Transportation Needs (1/20/2024)    Transportation Needs     Lack of Transportation: No   Stress: No Stress Concern Present  (1/20/2024)    Stress     Feeling of Stress : No   Housing Stability: Low Risk  (1/20/2024)    Housing Stability     Housing Instability: No        Medications (Active prior to today's visit):  Current Outpatient Medications   Medication Sig Dispense Refill    Norgestim-Eth Estrad Triphasic (TRI-LO-SPRINTEC) 0.18/0.215/0.25 MG-25 MCG Oral Tab Take 1 tablet by mouth daily. 84 tablet 4    ibuprofen 600 MG Oral Tab Take 1 tablet (600 mg total) by mouth every 6 (six) hours as needed for Pain. (Patient not taking: Reported on 9/5/2024) 30 tablet 0    Blood Glucose Monitoring Suppl (ONETOUCH VERIO FLEX SYSTEM) w/Device Does not apply Kit 1 kit 4 (four) times daily. (Patient not taking: Reported on 3/4/2024) 1 kit 0    prenatal vitamin with DHA 27-0.8-228 MG Oral Cap Take 1 capsule by mouth daily. (Patient not taking: Reported on 9/5/2024)         Allergies:  No Known Allergies    HISTORY:  Past Medical History:    Anemia    Gestational diabetes (HCC)    Iron deficiency anemia due to chronic blood loss    Irregular menses    With excessive bleeding    Yeast vaginitis      No past surgical history on file.   Family History   Problem Relation Age of Onset    Lipids Father       Social History:   Social History     Socioeconomic History    Marital status: Single   Tobacco Use    Smoking status: Never     Passive exposure: Current (monthly)    Smokeless tobacco: Never   Substance and Sexual Activity    Alcohol use: Not Currently     Comment: occ    Drug use: Never     Comment: once every 5 months     Social Determinants of Health     Financial Resource Strain: Low Risk  (1/20/2024)    Financial Resource Strain     Difficulty of Paying Living Expenses: Not hard at all     Med Affordability: No   Food Insecurity: No Food Insecurity (1/20/2024)    Food Insecurity     Food Insecurity: Never true   Transportation Needs: No Transportation Needs (1/20/2024)    Transportation Needs     Lack of Transportation: No   Stress: No Stress  Concern Present (1/20/2024)    Stress     Feeling of Stress : No   Housing Stability: Low Risk  (1/20/2024)    Housing Stability     Housing Instability: No        Medications (Active prior to today's visit):  Current Outpatient Medications   Medication Sig Dispense Refill    Norgestim-Eth Estrad Triphasic (TRI-LO-SPRINTEC) 0.18/0.215/0.25 MG-25 MCG Oral Tab Take 1 tablet by mouth daily. 84 tablet 4    ibuprofen 600 MG Oral Tab Take 1 tablet (600 mg total) by mouth every 6 (six) hours as needed for Pain. (Patient not taking: Reported on 9/5/2024) 30 tablet 0    Blood Glucose Monitoring Suppl (ONETOUCH VERIO FLEX SYSTEM) w/Device Does not apply Kit 1 kit 4 (four) times daily. (Patient not taking: Reported on 3/4/2024) 1 kit 0    prenatal vitamin with DHA 27-0.8-228 MG Oral Cap Take 1 capsule by mouth daily. (Patient not taking: Reported on 9/5/2024)         Allergies:  No Known Allergies       ROS:     Cardiovascular:  Negative forirregular heartbeat/palpitations. Negative for chest pain  Constitutional:  Negative for decreased activity, fever, irritability and lethargy  Gastrointestinal:  Negative for abdominal pain, constipation, decreased appetite, diarrhea and vomiting  Genitourinary:  Negative for dysuria and hematuria  Reproductive: Negative for vaginal discharge, itching, abnormal bleeding  Hema/Lymph:  Negative for easy bleeding and easy bruising  Neurological:  Negative for gait disturbance or dizziness  Psychiatric:  Negative for inappropriate interaction and psychiatric symptoms  Respiratory:  Negative for cough, dyspnea and wheezing      PHYSICAL EXAM:   Constitutional: appears well hydrated alert and responsive no acute distress noted  Neck/Thyroid: neck is supple without adenopathy No thyromegaly  Lymphatic: no abnormal cervical, supraclavicular or axillary adenopathy is noted  Respiratory: normal to inspection lungs are clear to auscultation bilaterally normal respiratory effort  Cardiovascular:  regular rate and rhythm no murmurs, gallups, or rubs  Abdomen: soft non-tender non-distended no organomegaly noted no masses  Genitourinary: Deferred. Pap up to date and without concern today.  Musculoskeletal: Normal strength, no swelling  Integumentary: Warm, Dry, appropriate color, Intact  Neurologic: Alert, Oriented  Psychiatric: Cooperative, appropriate mood and affect            ASSESSMENT/PLAN:   Assessment   Encounter Diagnosis   Name Primary?    Encounter for annual routine gynecological examination Yes       Normal gyne exam. Pap deferred -pap up to date. Next pap due 7/2026  STD testing- declined/ GC/CT, HIV, Treponemal, Hep B   Diet/excercise discussed  Discussed breast self awareness  Recommend multivitamin with folic acid  OCPs refilled. Reviewed warning signs, including ACHES         Orders This Visit:  No orders of the defined types were placed in this encounter.      Meds This Visit:  Requested Prescriptions      No prescriptions requested or ordered in this encounter       Imaging & Referrals:  None     9/5/2024  Lo Dumont CNM      Return to care in 1 year

## 2025-04-11 NOTE — PROGRESS NOTES
Subjective:   Patient ID: Ruthie Morrow is a 25 year old female.    Ruthie presents for virtual visit to discuss OCPs. She reports that other than during pregnancy she has been on OCPs for years. She was initially started due to irregular menses. She is wondering if this may not be an issue any more and is considering stopping OCPs. She reports she would plan to use condoms for birth control.        History/Other:   Review of Systems   All other systems reviewed and are negative.    Current Medications[1]  Allergies:Allergies[2]    Objective:   Physical Exam  Constitutional:       Appearance: Normal appearance.   Neurological:      Mental Status: She is alert and oriented to person, place, and time.   Psychiatric:         Mood and Affect: Mood normal.         Behavior: Behavior normal.         Thought Content: Thought content normal.         Judgment: Judgment normal.         Assessment & Plan:   1. Birth control counseling        No orders of the defined types were placed in this encounter.      Meds This Visit:  Requested Prescriptions      No prescriptions requested or ordered in this encounter       Imaging & Referrals:  None    Informed patient that there is no way to know if periods have regulated unless she stops OCPs. Informed her that fertility will return immediately once she stops OCPs so she will need to use condoms to prevent pregnancy.    She desires to stop OCPs and will plan to do so after current pack.    This visit is conducted using Telemedicine with live, interactive video and audio.    Patient has been referred to [facility] website at [email or web address] to review the yearly Consent to Treat document.    Patient understands and accepts financial responsibility for any deductible, co-insurance, and/or co-pays associated with this service.        [1]   Current Outpatient Medications   Medication Sig Dispense Refill    Norgestim-Eth Estrad Triphasic (TRI-LO-SPRINTEC) 0.18/0.215/0.25 MG-25 MCG  Oral Tab Take 1 tablet by mouth daily. 84 tablet 4    ibuprofen 600 MG Oral Tab Take 1 tablet (600 mg total) by mouth every 6 (six) hours as needed for Pain. (Patient not taking: Reported on 9/5/2024) 30 tablet 0    Blood Glucose Monitoring Suppl (ONETOUCH VERIO FLEX SYSTEM) w/Device Does not apply Kit 1 kit 4 (four) times daily. (Patient not taking: Reported on 3/4/2024) 1 kit 0    prenatal vitamin with DHA 27-0.8-228 MG Oral Cap Take 1 capsule by mouth daily. (Patient not taking: Reported on 9/5/2024)     [2] No Known Allergies

## (undated) NOTE — LETTER
VACCINE ADMINISTRATION RECORD  PARENT / GUARDIAN APPROVAL  Date: 2023  Vaccine administered to: Tawny Myers     : 3/17/2000    MRN: [id number]    A copy of the appropriate Centers for Disease Control and Prevention Vaccine Information statement has been provided. I have read or have had explained the information about the diseases and the vaccines listed below. There was an opportunity to ask questions and any questions were answered satisfactorily. I believe that I understand the benefits and risks of the vaccine cited and ask that the vaccine(s) listed below be given to me or to the person named above (for whom I am authorized to make this request). VACCINES ADMINISTERED:  Tdap    I have read and hereby agree to be bound by the terms of this agreement as stated above. My signature is valid until revoked by me in writing. This document is signed by Tram Hyman , relationship: Self on 2023.:                                                                                                                                         Parent / Guardian Signature                                                Date    R[place] served as a witness to authentication that the identity of the person signing electronically is in fact the person represented as signing.

## (undated) NOTE — LETTER
VACCINE ADMINISTRATION RECORD  PARENT / GUARDIAN APPROVAL  Date: 2023  Vaccine administered to: Marcy Jasso     : 3/17/2000    MRN: [id number]    A copy of the appropriate Centers for Disease Control and Prevention Vaccine Information statement has been provided. I have read or have had explained the information about the diseases and the vaccines listed below. There was an opportunity to ask questions and any questions were answered satisfactorily. I believe that I understand the benefits and risks of the vaccine cited and ask that the vaccine(s) listed below be given to me or to the person named above (for whom I am authorized to make this request). VACCINES ADMINISTERED:  Tdap    I have read and hereby agree to be bound by the terms of this agreement as stated above. My signature is valid until revoked by me in writing. This document is signed by Jeanne Cunha, relationship: Self on 2023.:                                                                                                                                         Parent / Guardian Signature                                                Date    Cindy Feliz served as a witness to authentication that the identity of the person signing electronically is in fact the person represented as signing.

## (undated) NOTE — LETTER
11/1/2021              Nikunj Zhou        [address] [facility]         Dear Sunny Brooke records indicate that the lab tests ordered for you by Ivy Velazquez MD  have not been done.   If you have, in fact, already completed th

## (undated) NOTE — LETTER
VACCINE ADMINISTRATION RECORD  PARENT / GUARDIAN APPROVAL  Date: 2021  Vaccine administered to:  Tessa Florez     : 3/17/2000    MRN: [id number]    A copy of the appropriate Centers for Disease Control and Prevention Vaccine Information statement has b

## (undated) NOTE — LETTER
Dear New Mom,    We hope you are doing well. If, for any reason, you have questions or concerns about your health or your baby’s health, please contact your provider or your pediatrician or family medicine physician regarding your baby.     At [facility], we feel that postpartum support is very important for new families. Please see the enclosed new parent support flyer that lists support programs and resources with both in-person and online options.     Additionally, our Breastfeeding Centers at [facility] and [facility] in [place], offer outpatient visits with our International Board-Certified Lactation   Consultants (IBCLCs) for any breastfeeding concerns or questions you may have.    For issues related to stress, anxiety or depression, we have a Nurturing Mom support group that meets both in-person or online.  There’s also a 24-hour Mom’s Line where you can request a phone call from a clinical therapist for assistance for postpartum depression.    We encourage you to take advantage of these programs and resources as you recover from childbirth and learn to care for your new infant.    Best wishes,    [facility]            [id number]